# Patient Record
Sex: FEMALE | Race: WHITE
[De-identification: names, ages, dates, MRNs, and addresses within clinical notes are randomized per-mention and may not be internally consistent; named-entity substitution may affect disease eponyms.]

---

## 2017-02-02 ENCOUNTER — RX RENEWAL (OUTPATIENT)
Age: 72
End: 2017-02-02

## 2017-02-17 ENCOUNTER — RX RENEWAL (OUTPATIENT)
Age: 72
End: 2017-02-17

## 2017-07-09 ENCOUNTER — EMERGENCY (EMERGENCY)
Facility: HOSPITAL | Age: 72
LOS: 0 days | Discharge: HOME | End: 2017-07-09
Attending: EMERGENCY MEDICINE | Admitting: EMERGENCY MEDICINE
Payer: MEDICARE

## 2017-07-09 VITALS
RESPIRATION RATE: 18 BRPM | OXYGEN SATURATION: 100 % | SYSTOLIC BLOOD PRESSURE: 157 MMHG | DIASTOLIC BLOOD PRESSURE: 85 MMHG | HEART RATE: 100 BPM | TEMPERATURE: 99 F | WEIGHT: 179.9 LBS

## 2017-07-09 VITALS
HEART RATE: 92 BPM | RESPIRATION RATE: 18 BRPM | OXYGEN SATURATION: 100 % | DIASTOLIC BLOOD PRESSURE: 79 MMHG | SYSTOLIC BLOOD PRESSURE: 140 MMHG

## 2017-07-09 DIAGNOSIS — R51 HEADACHE: ICD-10-CM

## 2017-07-09 DIAGNOSIS — I10 ESSENTIAL (PRIMARY) HYPERTENSION: ICD-10-CM

## 2017-07-09 PROCEDURE — 70450 CT HEAD/BRAIN W/O DYE: CPT | Mod: 26

## 2017-07-09 PROCEDURE — 99284 EMERGENCY DEPT VISIT MOD MDM: CPT

## 2017-07-09 RX ORDER — DIPHENHYDRAMINE HCL 50 MG
25 CAPSULE ORAL ONCE
Qty: 0 | Refills: 0 | Status: COMPLETED | OUTPATIENT
Start: 2017-07-09 | End: 2017-07-09

## 2017-07-09 RX ORDER — SODIUM CHLORIDE 9 MG/ML
1000 INJECTION INTRAMUSCULAR; INTRAVENOUS; SUBCUTANEOUS ONCE
Qty: 0 | Refills: 0 | Status: COMPLETED | OUTPATIENT
Start: 2017-07-09 | End: 2017-07-09

## 2017-07-09 RX ORDER — KETOROLAC TROMETHAMINE 30 MG/ML
30 SYRINGE (ML) INJECTION ONCE
Qty: 0 | Refills: 0 | Status: DISCONTINUED | OUTPATIENT
Start: 2017-07-09 | End: 2017-07-09

## 2017-07-09 RX ORDER — PROCHLORPERAZINE MALEATE 5 MG
10 TABLET ORAL ONCE
Qty: 0 | Refills: 0 | Status: COMPLETED | OUTPATIENT
Start: 2017-07-09 | End: 2017-07-09

## 2017-07-09 RX ORDER — DEXAMETHASONE 0.5 MG/5ML
10 ELIXIR ORAL ONCE
Qty: 0 | Refills: 0 | Status: COMPLETED | OUTPATIENT
Start: 2017-07-09 | End: 2017-07-09

## 2017-07-09 RX ADMIN — Medication 10 MILLIGRAM(S): at 03:40

## 2017-07-09 RX ADMIN — SODIUM CHLORIDE 1000 MILLILITER(S): 9 INJECTION INTRAMUSCULAR; INTRAVENOUS; SUBCUTANEOUS at 03:30

## 2017-07-09 RX ADMIN — Medication 30 MILLIGRAM(S): at 04:25

## 2017-07-09 RX ADMIN — Medication 25 MILLIGRAM(S): at 03:35

## 2017-07-09 RX ADMIN — Medication 100 MILLIGRAM(S): at 06:10

## 2017-07-09 RX ADMIN — Medication 102 MILLIGRAM(S): at 03:32

## 2017-07-09 NOTE — ED PROVIDER NOTE - CONSTITUTIONAL, MLM
normal... uncomfortable appearing, well nourished, awake, alert, oriented to person, place, time/situation and in no apparent distress.

## 2017-07-09 NOTE — ED ADULT NURSE NOTE - OBJECTIVE STATEMENT
pt presents to ED, grabbing her head and anxious stating " I don't want to have any strokes" pt has hx of migraines. States she has been sick for a few weeks but the pain in her head is unbearable and she is nauseous and has vomited once since arrival. Partner at bedside.

## 2017-07-09 NOTE — ED ADULT TRIAGE NOTE - CHIEF COMPLAINT QUOTE
Patient states severe migraine 10/10, worst headache shes ever head. Vomitting and difficulty breathing

## 2017-07-09 NOTE — ED PROVIDER NOTE - PROGRESS NOTE DETAILS
AJM: Pt feeling significantly improved. ct hea dnormal. normal neuro exam. dc AJM: on dc pt noticed target like rash on left thigh. concern for lyme. will give doxy AJM: Pt feeling significantly improved. ct head normal. normal neuro exam. dc

## 2017-07-09 NOTE — ED PROVIDER NOTE - OBJECTIVE STATEMENT
Patient is a 71 year old female with history of HTN presenting with headache. Symptoms began 3 days ago. usually at ngiht. intermittent. Began again yesterday morning and slolwy worsening. Pain is worse in front of head. Patient notes headache was not sudden onset and began as mild headache that gradually grew more intense. Denies any focal weaknes/numbness. No confusion, changes in vision, neck pain. No family history of SAH or aneurysms. No syncope. no chest pain, sob, trauma. Took tylenol and zpac for presumed sinus infection. + photophobia. She notes she has had migraines in the past.

## 2017-07-09 NOTE — ED PROVIDER NOTE - MEDICAL DECISION MAKING DETAILS
71 year old female with headache. no neuro deficits. low suspicion for SAH. will ct head given how uncomfortable she appears. migraine cocktail. reassess

## 2017-07-28 ENCOUNTER — RX RENEWAL (OUTPATIENT)
Age: 72
End: 2017-07-28

## 2017-08-09 ENCOUNTER — LABORATORY RESULT (OUTPATIENT)
Age: 72
End: 2017-08-09

## 2017-08-12 LAB
25(OH)D3 SERPL-MCNC: 45.6 NG/ML
ADJUSTED MITOGEN: 4.86 IU/ML
ADJUSTED TB AG: 0 IU/ML
ALBUMIN SERPL ELPH-MCNC: 4.4 G/DL
ALP BLD-CCNC: 87 U/L
ALT SERPL-CCNC: 21 U/L
ANION GAP SERPL CALC-SCNC: 16 MMOL/L
APPEARANCE: CLEAR
AST SERPL-CCNC: 29 U/L
B BURGDOR DNA SPEC QL NAA+PROBE: NEGATIVE
B BURGDOR IGG+IGM SER QL IB: NORMAL
BASOPHILS # BLD AUTO: 0.01 K/UL
BASOPHILS NFR BLD AUTO: 0.2 %
BILIRUB SERPL-MCNC: 0.4 MG/DL
BILIRUBIN URINE: NEGATIVE
BLOOD URINE: NEGATIVE
BUN SERPL-MCNC: 19 MG/DL
CALCIUM SERPL-MCNC: 9.6 MG/DL
CHLORIDE SERPL-SCNC: 100 MMOL/L
CHOLEST SERPL-MCNC: 192 MG/DL
CHOLEST/HDLC SERPL: 2.4 RATIO
CO2 SERPL-SCNC: 22 MMOL/L
COLOR: YELLOW
CREAT SERPL-MCNC: 1.01 MG/DL
CRP SERPL-MCNC: 0.2 MG/DL
EOSINOPHIL # BLD AUTO: 0.17 K/UL
EOSINOPHIL NFR BLD AUTO: 3.5 %
ERYTHROCYTE [SEDIMENTATION RATE] IN BLOOD BY WESTERGREN METHOD: 18 MM/HR
GLUCOSE QUALITATIVE U: NORMAL MG/DL
GLUCOSE SERPL-MCNC: 102 MG/DL
HBV CORE IGG+IGM SER QL: NONREACTIVE
HBV SURFACE AB SER QL: NONREACTIVE
HBV SURFACE AG SER QL: NONREACTIVE
HCT VFR BLD CALC: 39.9 %
HCV AB SER QL: NONREACTIVE
HCV S/CO RATIO: 0.2 S/CO
HDLC SERPL-MCNC: 79 MG/DL
HGB BLD-MCNC: 14 G/DL
IMM GRANULOCYTES NFR BLD AUTO: 0.2 %
KETONES URINE: NEGATIVE
LDLC SERPL CALC-MCNC: 89 MG/DL
LEUKOCYTE ESTERASE URINE: NEGATIVE
LYMPHOCYTES # BLD AUTO: 1.64 K/UL
LYMPHOCYTES NFR BLD AUTO: 34.2 %
M TB IFN-G BLD-IMP: NEGATIVE
MAN DIFF?: NORMAL
MCHC RBC-ENTMCNC: 30.8 PG
MCHC RBC-ENTMCNC: 35.1 GM/DL
MCV RBC AUTO: 87.9 FL
MONOCYTES # BLD AUTO: 0.4 K/UL
MONOCYTES NFR BLD AUTO: 8.3 %
NEUTROPHILS # BLD AUTO: 2.57 K/UL
NEUTROPHILS NFR BLD AUTO: 53.6 %
NITRITE URINE: NEGATIVE
PH URINE: 6
PLATELET # BLD AUTO: 233 K/UL
POTASSIUM SERPL-SCNC: 3.9 MMOL/L
PROT SERPL-MCNC: 7.6 G/DL
PROTEIN URINE: ABNORMAL MG/DL
QUANTIFERON GOLD NIL: 0.03 IU/ML
RBC # BLD: 4.54 M/UL
RBC # FLD: 12.8 %
RHEUMATOID FACT SER QL: <7 IU/ML
SODIUM SERPL-SCNC: 138 MMOL/L
SPECIFIC GRAVITY URINE: 1.02
TRIGL SERPL-MCNC: 118 MG/DL
UROBILINOGEN URINE: NORMAL MG/DL
WBC # FLD AUTO: 4.8 K/UL

## 2017-08-15 ENCOUNTER — APPOINTMENT (OUTPATIENT)
Dept: RHEUMATOLOGY | Facility: CLINIC | Age: 72
End: 2017-08-15
Payer: MEDICARE

## 2017-08-15 VITALS
BODY MASS INDEX: 26.22 KG/M2 | SYSTOLIC BLOOD PRESSURE: 133 MMHG | HEIGHT: 69 IN | OXYGEN SATURATION: 98 % | HEART RATE: 89 BPM | TEMPERATURE: 98.4 F | WEIGHT: 177 LBS | DIASTOLIC BLOOD PRESSURE: 73 MMHG

## 2017-08-15 PROCEDURE — 99215 OFFICE O/P EST HI 40 MIN: CPT

## 2017-09-13 ENCOUNTER — LABORATORY RESULT (OUTPATIENT)
Age: 72
End: 2017-09-13

## 2017-09-14 LAB
25(OH)D3 SERPL-MCNC: 37.9 NG/ML
ALBUMIN SERPL ELPH-MCNC: 4.5 G/DL
ALP BLD-CCNC: 110 U/L
ALT SERPL-CCNC: 29 U/L
ANION GAP SERPL CALC-SCNC: 18 MMOL/L
APPEARANCE: CLEAR
AST SERPL-CCNC: 30 U/L
BASOPHILS # BLD AUTO: 0.02 K/UL
BASOPHILS NFR BLD AUTO: 0.4 %
BILIRUB SERPL-MCNC: 0.6 MG/DL
BILIRUBIN URINE: NEGATIVE
BLOOD URINE: ABNORMAL
BUN SERPL-MCNC: 23 MG/DL
CALCIUM SERPL-MCNC: 10.2 MG/DL
CALCIUM SERPL-MCNC: 10.2 MG/DL
CHLORIDE SERPL-SCNC: 100 MMOL/L
CHOLEST SERPL-MCNC: 251 MG/DL
CHOLEST/HDLC SERPL: 3.6 RATIO
CO2 SERPL-SCNC: 21 MMOL/L
COLOR: YELLOW
CREAT SERPL-MCNC: 0.95 MG/DL
CRP SERPL-MCNC: 0.3 MG/DL
EOSINOPHIL # BLD AUTO: 0.16 K/UL
EOSINOPHIL NFR BLD AUTO: 3.4 %
ERYTHROCYTE [SEDIMENTATION RATE] IN BLOOD BY WESTERGREN METHOD: 13 MM/HR
GLUCOSE QUALITATIVE U: NORMAL MG/DL
GLUCOSE SERPL-MCNC: 108 MG/DL
HCT VFR BLD CALC: 40.3 %
HDLC SERPL-MCNC: 70 MG/DL
HGB BLD-MCNC: 13.9 G/DL
IMM GRANULOCYTES NFR BLD AUTO: 0.2 %
KETONES URINE: NEGATIVE
LDLC SERPL CALC-MCNC: 144 MG/DL
LEUKOCYTE ESTERASE URINE: NEGATIVE
LYMPHOCYTES # BLD AUTO: 1.53 K/UL
LYMPHOCYTES NFR BLD AUTO: 32.6 %
MAN DIFF?: NORMAL
MCHC RBC-ENTMCNC: 30.9 PG
MCHC RBC-ENTMCNC: 34.5 GM/DL
MCV RBC AUTO: 89.6 FL
MONOCYTES # BLD AUTO: 0.31 K/UL
MONOCYTES NFR BLD AUTO: 6.6 %
NEUTROPHILS # BLD AUTO: 2.67 K/UL
NEUTROPHILS NFR BLD AUTO: 56.8 %
NITRITE URINE: NEGATIVE
PARATHYROID HORMONE INTACT: 27 PG/ML
PH URINE: 6.5
PLATELET # BLD AUTO: 245 K/UL
POTASSIUM SERPL-SCNC: 3.8 MMOL/L
PROT SERPL-MCNC: 7.3 G/DL
PROTEIN URINE: ABNORMAL MG/DL
RBC # BLD: 4.5 M/UL
RBC # FLD: 13.1 %
SODIUM SERPL-SCNC: 139 MMOL/L
SPECIFIC GRAVITY URINE: 1.02
TRIGL SERPL-MCNC: 187 MG/DL
UROBILINOGEN URINE: NORMAL MG/DL
WBC # FLD AUTO: 4.7 K/UL

## 2017-09-19 ENCOUNTER — APPOINTMENT (OUTPATIENT)
Dept: RHEUMATOLOGY | Facility: CLINIC | Age: 72
End: 2017-09-19
Payer: MEDICARE

## 2017-09-19 VITALS
HEART RATE: 79 BPM | HEIGHT: 69 IN | SYSTOLIC BLOOD PRESSURE: 144 MMHG | WEIGHT: 181 LBS | TEMPERATURE: 98.4 F | BODY MASS INDEX: 26.81 KG/M2 | OXYGEN SATURATION: 100 % | DIASTOLIC BLOOD PRESSURE: 80 MMHG

## 2017-09-19 LAB
B BURGDOR AB SER-IMP: POSITIVE
B BURGDOR DNA SPEC QL NAA+PROBE: NEGATIVE
B BURGDOR IGM PATRN SER IB-IMP: POSITIVE
B BURGDOR18/20KD IGM SER QL IB: NORMAL
B BURGDOR18KD IGG SER QL IB: PRESENT
B BURGDOR23KD IGG SER QL IB: PRESENT
B BURGDOR23KD IGM SER QL IB: PRESENT
B BURGDOR28KD AB SER QL IB: NORMAL
B BURGDOR28KD IGG SER QL IB: NORMAL
B BURGDOR30KD AB SER QL IB: NORMAL
B BURGDOR30KD IGG SER QL IB: NORMAL
B BURGDOR31KD IGG SER QL IB: NORMAL
B BURGDOR31KD IGM SER QL IB: NORMAL
B BURGDOR39KD IGG SER QL IB: PRESENT
B BURGDOR39KD IGM SER QL IB: PRESENT
B BURGDOR41KD IGG SER QL IB: PRESENT
B BURGDOR41KD IGM SER QL IB: PRESENT
B BURGDOR45KD AB SER QL IB: NORMAL
B BURGDOR45KD IGG SER QL IB: PRESENT
B BURGDOR58KD AB SER QL IB: NORMAL
B BURGDOR58KD IGG SER QL IB: PRESENT
B BURGDOR66KD IGG SER QL IB: NORMAL
B BURGDOR66KD IGM SER QL IB: NORMAL
B BURGDOR93KD IGG SER QL IB: NORMAL
B BURGDOR93KD IGM SER QL IB: NORMAL

## 2017-09-19 PROCEDURE — 99214 OFFICE O/P EST MOD 30 MIN: CPT

## 2017-09-24 LAB
A PHAGOCYTOPH IGG TITR SER IF: NORMAL
ANAPLASMA PHAGOCYTOPHILIA IGG ANTIBODIES: NORMAL
ANAPLASMA PHAGOCYTOPHILIA IGM ANTIBODIES: NORMAL
BABESIA ANTIBODIES, IGG: NORMAL
BABESIA ANTIBODIES, IGM: NORMAL
EHRLICHIA AB SER QL IA: NORMAL
EHRLICIA CHAFFEENIS IGG ANTIBODIES: NORMAL
EHRLICIA CHAFFEENIS IGM ANTIBODIES: NORMAL

## 2017-10-19 ENCOUNTER — APPOINTMENT (OUTPATIENT)
Dept: RHEUMATOLOGY | Facility: CLINIC | Age: 72
End: 2017-10-19

## 2017-11-03 ENCOUNTER — MEDICATION RENEWAL (OUTPATIENT)
Age: 72
End: 2017-11-03

## 2017-12-04 ENCOUNTER — RX RENEWAL (OUTPATIENT)
Age: 72
End: 2017-12-04

## 2017-12-04 ENCOUNTER — LABORATORY RESULT (OUTPATIENT)
Age: 72
End: 2017-12-04

## 2017-12-05 LAB
ALBUMIN SERPL ELPH-MCNC: 4.5 G/DL
ALP BLD-CCNC: 106 U/L
ALT SERPL-CCNC: 26 U/L
ANION GAP SERPL CALC-SCNC: 15 MMOL/L
APPEARANCE: CLEAR
AST SERPL-CCNC: 28 U/L
B BURGDOR AB SER-IMP: NEGATIVE
B BURGDOR IGM PATRN SER IB-IMP: POSITIVE
B BURGDOR18/20KD IGM SER QL IB: NORMAL
B BURGDOR18KD IGG SER QL IB: PRESENT
B BURGDOR23KD IGG SER QL IB: PRESENT
B BURGDOR23KD IGM SER QL IB: PRESENT
B BURGDOR28KD AB SER QL IB: NORMAL
B BURGDOR28KD IGG SER QL IB: NORMAL
B BURGDOR30KD AB SER QL IB: NORMAL
B BURGDOR30KD IGG SER QL IB: NORMAL
B BURGDOR31KD IGG SER QL IB: NORMAL
B BURGDOR31KD IGM SER QL IB: NORMAL
B BURGDOR39KD IGG SER QL IB: PRESENT
B BURGDOR39KD IGM SER QL IB: NORMAL
B BURGDOR41KD IGG SER QL IB: NORMAL
B BURGDOR41KD IGM SER QL IB: PRESENT
B BURGDOR45KD AB SER QL IB: NORMAL
B BURGDOR45KD IGG SER QL IB: NORMAL
B BURGDOR58KD AB SER QL IB: NORMAL
B BURGDOR58KD IGG SER QL IB: NORMAL
B BURGDOR66KD IGG SER QL IB: NORMAL
B BURGDOR66KD IGM SER QL IB: NORMAL
B BURGDOR93KD IGG SER QL IB: NORMAL
B BURGDOR93KD IGM SER QL IB: NORMAL
BASOPHILS # BLD AUTO: 0.02 K/UL
BASOPHILS NFR BLD AUTO: 0.4 %
BILIRUB SERPL-MCNC: 0.5 MG/DL
BILIRUBIN URINE: NEGATIVE
BLOOD URINE: NEGATIVE
BUN SERPL-MCNC: 17 MG/DL
CALCIUM SERPL-MCNC: 10.1 MG/DL
CHLORIDE SERPL-SCNC: 98 MMOL/L
CO2 SERPL-SCNC: 24 MMOL/L
COLOR: YELLOW
CREAT SERPL-MCNC: 0.98 MG/DL
CRP SERPL-MCNC: 0.2 MG/DL
EOSINOPHIL # BLD AUTO: 0.19 K/UL
EOSINOPHIL NFR BLD AUTO: 3.6 %
ERYTHROCYTE [SEDIMENTATION RATE] IN BLOOD BY WESTERGREN METHOD: 18 MM/HR
GLUCOSE QUALITATIVE U: NEGATIVE MG/DL
GLUCOSE SERPL-MCNC: 106 MG/DL
HCT VFR BLD CALC: 40.9 %
HGB BLD-MCNC: 14.8 G/DL
IMM GRANULOCYTES NFR BLD AUTO: 0.6 %
KETONES URINE: NEGATIVE
LEUKOCYTE ESTERASE URINE: ABNORMAL
LYMPHOCYTES # BLD AUTO: 1.64 K/UL
LYMPHOCYTES NFR BLD AUTO: 30.7 %
MAN DIFF?: NORMAL
MCHC RBC-ENTMCNC: 32.5 PG
MCHC RBC-ENTMCNC: 36.2 GM/DL
MCV RBC AUTO: 89.7 FL
MONOCYTES # BLD AUTO: 0.45 K/UL
MONOCYTES NFR BLD AUTO: 8.4 %
NEUTROPHILS # BLD AUTO: 3.02 K/UL
NEUTROPHILS NFR BLD AUTO: 56.3 %
NITRITE URINE: NEGATIVE
PH URINE: 6.5
PLATELET # BLD AUTO: 248 K/UL
POTASSIUM SERPL-SCNC: 3.8 MMOL/L
PROT SERPL-MCNC: 7.6 G/DL
PROTEIN URINE: NEGATIVE MG/DL
RBC # BLD: 4.56 M/UL
RBC # FLD: 12.7 %
SODIUM SERPL-SCNC: 137 MMOL/L
SPECIFIC GRAVITY URINE: 1.02
UROBILINOGEN URINE: NEGATIVE MG/DL
WBC # FLD AUTO: 5.35 K/UL

## 2017-12-07 LAB — TSH SERPL-ACNC: 5.46 UIU/ML

## 2017-12-08 ENCOUNTER — APPOINTMENT (OUTPATIENT)
Dept: RHEUMATOLOGY | Facility: CLINIC | Age: 72
End: 2017-12-08
Payer: MEDICARE

## 2017-12-08 VITALS
HEART RATE: 82 BPM | WEIGHT: 183 LBS | HEIGHT: 69 IN | DIASTOLIC BLOOD PRESSURE: 74 MMHG | BODY MASS INDEX: 27.11 KG/M2 | TEMPERATURE: 98 F | SYSTOLIC BLOOD PRESSURE: 150 MMHG | OXYGEN SATURATION: 99 %

## 2017-12-08 DIAGNOSIS — J32.1 CHRONIC FRONTAL SINUSITIS: ICD-10-CM

## 2017-12-08 PROCEDURE — 99214 OFFICE O/P EST MOD 30 MIN: CPT

## 2017-12-08 RX ORDER — ZOSTER VACCINE RECOMBINANT, ADJUVANTED 50 MCG/0.5
50 KIT INTRAMUSCULAR
Qty: 1 | Refills: 0 | Status: ACTIVE | COMMUNITY
Start: 2017-12-08 | End: 1900-01-01

## 2018-01-21 ENCOUNTER — TRANSCRIPTION ENCOUNTER (OUTPATIENT)
Age: 73
End: 2018-01-21

## 2018-02-07 ENCOUNTER — LABORATORY RESULT (OUTPATIENT)
Age: 73
End: 2018-02-07

## 2018-02-09 LAB
25(OH)D3 SERPL-MCNC: 51.3 NG/ML
ALBUMIN SERPL ELPH-MCNC: 4.5 G/DL
ALP BLD-CCNC: 104 U/L
ALT SERPL-CCNC: 20 U/L
ANION GAP SERPL CALC-SCNC: 18 MMOL/L
APPEARANCE: CLEAR
AST SERPL-CCNC: 27 U/L
BASOPHILS # BLD AUTO: 0.01 K/UL
BASOPHILS NFR BLD AUTO: 0.2 %
BILIRUB SERPL-MCNC: 0.5 MG/DL
BILIRUBIN URINE: NEGATIVE
BLOOD URINE: ABNORMAL
BUN SERPL-MCNC: 20 MG/DL
CALCIUM SERPL-MCNC: 10.1 MG/DL
CALCIUM SERPL-MCNC: 10.1 MG/DL
CHLORIDE SERPL-SCNC: 100 MMOL/L
CO2 SERPL-SCNC: 23 MMOL/L
COLOR: YELLOW
CREAT SERPL-MCNC: 1.04 MG/DL
CRP SERPL-MCNC: 0.2 MG/DL
EOSINOPHIL # BLD AUTO: 0.16 K/UL
EOSINOPHIL NFR BLD AUTO: 2.9 %
ERYTHROCYTE [SEDIMENTATION RATE] IN BLOOD BY WESTERGREN METHOD: 14 MM/HR
GLUCOSE QUALITATIVE U: NEGATIVE MG/DL
GLUCOSE SERPL-MCNC: 122 MG/DL
HCT VFR BLD CALC: 42.8 %
HGB BLD-MCNC: 15.1 G/DL
IMM GRANULOCYTES NFR BLD AUTO: 0.2 %
KETONES URINE: NEGATIVE
LEUKOCYTE ESTERASE URINE: NEGATIVE
LYMPHOCYTES # BLD AUTO: 1.8 K/UL
LYMPHOCYTES NFR BLD AUTO: 33 %
MAN DIFF?: NORMAL
MCHC RBC-ENTMCNC: 31.7 PG
MCHC RBC-ENTMCNC: 35.3 GM/DL
MCV RBC AUTO: 89.9 FL
MONOCYTES # BLD AUTO: 0.47 K/UL
MONOCYTES NFR BLD AUTO: 8.6 %
NEUTROPHILS # BLD AUTO: 3.01 K/UL
NEUTROPHILS NFR BLD AUTO: 55.1 %
NITRITE URINE: NEGATIVE
PARATHYROID HORMONE INTACT: 32 PG/ML
PH URINE: 7
PLATELET # BLD AUTO: 239 K/UL
POTASSIUM SERPL-SCNC: 3.7 MMOL/L
PROT SERPL-MCNC: 7.8 G/DL
PROTEIN URINE: 30 MG/DL
RBC # BLD: 4.76 M/UL
RBC # FLD: 12.7 %
SODIUM SERPL-SCNC: 141 MMOL/L
SPECIFIC GRAVITY URINE: 1.02
UROBILINOGEN URINE: NEGATIVE MG/DL
WBC # FLD AUTO: 5.46 K/UL

## 2018-02-12 LAB
B BURGDOR AB SER-IMP: NEGATIVE
B BURGDOR IGM PATRN SER IB-IMP: POSITIVE
B BURGDOR18/20KD IGM SER QL IB: NORMAL
B BURGDOR18KD IGG SER QL IB: NORMAL
B BURGDOR23KD IGG SER QL IB: PRESENT
B BURGDOR23KD IGM SER QL IB: PRESENT
B BURGDOR28KD AB SER QL IB: NORMAL
B BURGDOR28KD IGG SER QL IB: NORMAL
B BURGDOR30KD AB SER QL IB: NORMAL
B BURGDOR30KD IGG SER QL IB: NORMAL
B BURGDOR31KD IGG SER QL IB: NORMAL
B BURGDOR31KD IGM SER QL IB: NORMAL
B BURGDOR39KD IGG SER QL IB: PRESENT
B BURGDOR39KD IGM SER QL IB: PRESENT
B BURGDOR41KD IGG SER QL IB: NORMAL
B BURGDOR41KD IGM SER QL IB: NORMAL
B BURGDOR45KD AB SER QL IB: NORMAL
B BURGDOR45KD IGG SER QL IB: NORMAL
B BURGDOR58KD AB SER QL IB: NORMAL
B BURGDOR58KD IGG SER QL IB: NORMAL
B BURGDOR66KD IGG SER QL IB: NORMAL
B BURGDOR66KD IGM SER QL IB: NORMAL
B BURGDOR93KD IGG SER QL IB: NORMAL
B BURGDOR93KD IGM SER QL IB: NORMAL

## 2018-02-13 ENCOUNTER — APPOINTMENT (OUTPATIENT)
Dept: RHEUMATOLOGY | Facility: CLINIC | Age: 73
End: 2018-02-13
Payer: MEDICARE

## 2018-02-13 VITALS
OXYGEN SATURATION: 98 % | SYSTOLIC BLOOD PRESSURE: 130 MMHG | BODY MASS INDEX: 26.07 KG/M2 | DIASTOLIC BLOOD PRESSURE: 75 MMHG | WEIGHT: 176 LBS | HEART RATE: 83 BPM | HEIGHT: 69 IN

## 2018-02-13 DIAGNOSIS — R73.9 HYPERGLYCEMIA, UNSPECIFIED: ICD-10-CM

## 2018-02-13 DIAGNOSIS — E55.9 VITAMIN D DEFICIENCY, UNSPECIFIED: ICD-10-CM

## 2018-02-13 PROCEDURE — 99213 OFFICE O/P EST LOW 20 MIN: CPT

## 2018-02-13 RX ORDER — DOXYCYCLINE 100 MG/1
100 TABLET, FILM COATED ORAL
Qty: 42 | Refills: 0 | Status: DISCONTINUED | COMMUNITY
Start: 2017-07-09 | End: 2018-02-13

## 2018-03-16 ENCOUNTER — LABORATORY RESULT (OUTPATIENT)
Age: 73
End: 2018-03-16

## 2018-03-20 ENCOUNTER — FORM ENCOUNTER (OUTPATIENT)
Age: 73
End: 2018-03-20

## 2018-03-20 ENCOUNTER — APPOINTMENT (OUTPATIENT)
Dept: RHEUMATOLOGY | Facility: CLINIC | Age: 73
End: 2018-03-20
Payer: MEDICARE

## 2018-03-20 VITALS
HEART RATE: 84 BPM | WEIGHT: 178 LBS | BODY MASS INDEX: 26.36 KG/M2 | SYSTOLIC BLOOD PRESSURE: 139 MMHG | OXYGEN SATURATION: 99 % | HEIGHT: 69 IN | DIASTOLIC BLOOD PRESSURE: 79 MMHG

## 2018-03-20 PROCEDURE — 99214 OFFICE O/P EST MOD 30 MIN: CPT

## 2018-03-21 ENCOUNTER — OUTPATIENT (OUTPATIENT)
Dept: OUTPATIENT SERVICES | Facility: HOSPITAL | Age: 73
LOS: 1 days | End: 2018-03-21
Payer: MEDICARE

## 2018-03-21 ENCOUNTER — APPOINTMENT (OUTPATIENT)
Dept: ULTRASOUND IMAGING | Facility: CLINIC | Age: 73
End: 2018-03-21
Payer: MEDICARE

## 2018-03-21 DIAGNOSIS — Z00.8 ENCOUNTER FOR OTHER GENERAL EXAMINATION: ICD-10-CM

## 2018-03-21 PROCEDURE — 76536 US EXAM OF HEAD AND NECK: CPT

## 2018-03-21 PROCEDURE — 76536 US EXAM OF HEAD AND NECK: CPT | Mod: 26

## 2018-03-23 ENCOUNTER — APPOINTMENT (OUTPATIENT)
Dept: RHEUMATOLOGY | Facility: CLINIC | Age: 73
End: 2018-03-23
Payer: MEDICARE

## 2018-03-23 VITALS
SYSTOLIC BLOOD PRESSURE: 133 MMHG | BODY MASS INDEX: 26.36 KG/M2 | DIASTOLIC BLOOD PRESSURE: 75 MMHG | WEIGHT: 178 LBS | OXYGEN SATURATION: 97 % | HEIGHT: 69 IN | HEART RATE: 95 BPM

## 2018-03-23 DIAGNOSIS — A69.20 LYME DISEASE, UNSPECIFIED: ICD-10-CM

## 2018-03-23 PROCEDURE — 99214 OFFICE O/P EST MOD 30 MIN: CPT

## 2018-03-23 RX ORDER — GABAPENTIN 100 MG/1
100 CAPSULE ORAL
Qty: 90 | Refills: 1 | Status: DISCONTINUED | COMMUNITY
Start: 2017-10-24 | End: 2018-03-23

## 2018-03-29 PROBLEM — A69.20: Status: ACTIVE | Noted: 2018-03-20

## 2018-04-09 ENCOUNTER — TRANSCRIPTION ENCOUNTER (OUTPATIENT)
Age: 73
End: 2018-04-09

## 2018-04-10 ENCOUNTER — APPOINTMENT (OUTPATIENT)
Dept: RHEUMATOLOGY | Facility: CLINIC | Age: 73
End: 2018-04-10
Payer: MEDICARE

## 2018-04-10 VITALS
HEIGHT: 69 IN | HEART RATE: 105 BPM | WEIGHT: 178 LBS | DIASTOLIC BLOOD PRESSURE: 70 MMHG | SYSTOLIC BLOOD PRESSURE: 130 MMHG | OXYGEN SATURATION: 97 % | BODY MASS INDEX: 26.36 KG/M2

## 2018-04-10 PROCEDURE — 99214 OFFICE O/P EST MOD 30 MIN: CPT

## 2018-04-10 RX ORDER — DICLOFENAC SODIUM 10 MG/G
1 GEL TOPICAL
Qty: 500 | Refills: 0 | Status: ACTIVE | COMMUNITY
Start: 2017-10-24

## 2018-04-10 RX ORDER — MISOPROSTOL 200 UG/1
200 TABLET ORAL
Qty: 2 | Refills: 0 | Status: ACTIVE | COMMUNITY
Start: 2018-02-09

## 2018-04-10 RX ORDER — PENCICLOVIR 10 MG/G
1 CREAM TOPICAL
Qty: 5 | Refills: 0 | Status: ACTIVE | COMMUNITY
Start: 2017-11-07

## 2018-04-18 ENCOUNTER — RX RENEWAL (OUTPATIENT)
Age: 73
End: 2018-04-18

## 2018-05-04 ENCOUNTER — RX RENEWAL (OUTPATIENT)
Age: 73
End: 2018-05-04

## 2018-05-04 RX ORDER — ERGOCALCIFEROL 1.25 MG/1
1.25 MG CAPSULE, LIQUID FILLED ORAL
Qty: 12 | Refills: 3 | Status: ACTIVE | COMMUNITY
Start: 2017-11-03 | End: 1900-01-01

## 2018-05-22 ENCOUNTER — APPOINTMENT (OUTPATIENT)
Dept: RHEUMATOLOGY | Facility: CLINIC | Age: 73
End: 2018-05-22
Payer: MEDICARE

## 2018-05-22 VITALS
WEIGHT: 185 LBS | OXYGEN SATURATION: 98 % | HEART RATE: 105 BPM | HEIGHT: 69 IN | BODY MASS INDEX: 27.4 KG/M2 | DIASTOLIC BLOOD PRESSURE: 79 MMHG | SYSTOLIC BLOOD PRESSURE: 137 MMHG

## 2018-05-22 DIAGNOSIS — G47.00 INSOMNIA, UNSPECIFIED: ICD-10-CM

## 2018-05-22 DIAGNOSIS — F41.1 GENERALIZED ANXIETY DISORDER: ICD-10-CM

## 2018-05-22 DIAGNOSIS — A69.20 LYME DISEASE, UNSPECIFIED: ICD-10-CM

## 2018-05-22 DIAGNOSIS — E03.8 OTHER SPECIFIED HYPOTHYROIDISM: ICD-10-CM

## 2018-05-22 DIAGNOSIS — E06.3 OTHER SPECIFIED HYPOTHYROIDISM: ICD-10-CM

## 2018-05-22 PROCEDURE — 99214 OFFICE O/P EST MOD 30 MIN: CPT

## 2018-05-22 RX ORDER — FOLIC ACID-PYRIDOXINE-CYANOCOBALAMIN TAB 2.5-25-2 MG 2.5-25-2 MG
2.5-25-2 TAB ORAL
Qty: 90 | Refills: 3 | Status: ACTIVE | COMMUNITY
Start: 2018-05-22 | End: 1900-01-01

## 2018-05-22 RX ORDER — MONTELUKAST 10 MG/1
10 TABLET, FILM COATED ORAL
Qty: 90 | Refills: 0 | Status: ACTIVE | COMMUNITY
Start: 2018-05-22

## 2018-05-22 RX ORDER — HYDROXYZINE HYDROCHLORIDE 25 MG/1
25 TABLET ORAL
Qty: 21 | Refills: 0 | Status: DISCONTINUED | COMMUNITY
Start: 2018-04-09 | End: 2018-05-22

## 2018-05-22 RX ORDER — SERTRALINE 25 MG/1
25 TABLET, FILM COATED ORAL DAILY
Qty: 90 | Refills: 2 | Status: DISCONTINUED | COMMUNITY
Start: 2018-03-23 | End: 2018-05-22

## 2018-05-23 ENCOUNTER — OTHER (OUTPATIENT)
Age: 73
End: 2018-05-23

## 2018-05-25 PROBLEM — E03.8 HYPOTHYROIDISM DUE TO HASHIMOTO'S THYROIDITIS: Status: ACTIVE | Noted: 2017-09-24

## 2018-05-25 PROBLEM — A69.20 ACUTE LYME DISEASE: Status: ACTIVE | Noted: 2017-08-15

## 2018-05-25 PROBLEM — F41.1 GENERALIZED ANXIETY DISORDER: Status: ACTIVE | Noted: 2018-03-20

## 2018-06-01 ENCOUNTER — RX RENEWAL (OUTPATIENT)
Age: 73
End: 2018-06-01

## 2018-07-12 ENCOUNTER — MEDICATION RENEWAL (OUTPATIENT)
Age: 73
End: 2018-07-12

## 2018-07-16 ENCOUNTER — MEDICATION RENEWAL (OUTPATIENT)
Age: 73
End: 2018-07-16

## 2018-07-16 RX ORDER — AMITRIPTYLINE HYDROCHLORIDE 10 MG/1
10 TABLET, FILM COATED ORAL
Qty: 90 | Refills: 1 | Status: ACTIVE | COMMUNITY
Start: 2018-05-22 | End: 1900-01-01

## 2018-08-14 ENCOUNTER — OTHER (OUTPATIENT)
Age: 73
End: 2018-08-14

## 2018-09-20 ENCOUNTER — MEDICATION RENEWAL (OUTPATIENT)
Age: 73
End: 2018-09-20

## 2018-09-20 RX ORDER — TRAMADOL HYDROCHLORIDE AND ACETAMINOPHEN 37.5; 325 MG/1; MG/1
37.5-325 TABLET, FILM COATED ORAL
Qty: 90 | Refills: 0 | Status: ACTIVE | COMMUNITY
Start: 2018-04-26 | End: 1900-01-01

## 2018-10-15 PROBLEM — I10 ESSENTIAL (PRIMARY) HYPERTENSION: Chronic | Status: ACTIVE | Noted: 2017-07-09

## 2018-11-20 ENCOUNTER — TRANSCRIPTION ENCOUNTER (OUTPATIENT)
Age: 73
End: 2018-11-20

## 2019-01-03 ENCOUNTER — APPOINTMENT (OUTPATIENT)
Dept: RHEUMATOLOGY | Facility: CLINIC | Age: 74
End: 2019-01-03
Payer: MEDICARE

## 2019-01-03 VITALS
WEIGHT: 180 LBS | BODY MASS INDEX: 26.66 KG/M2 | DIASTOLIC BLOOD PRESSURE: 82 MMHG | HEART RATE: 92 BPM | HEIGHT: 69 IN | SYSTOLIC BLOOD PRESSURE: 137 MMHG | OXYGEN SATURATION: 98 %

## 2019-01-03 DIAGNOSIS — M06.9 RHEUMATOID ARTHRITIS, UNSPECIFIED: ICD-10-CM

## 2019-01-03 DIAGNOSIS — M35.00 SICCA SYNDROME, UNSPECIFIED: ICD-10-CM

## 2019-01-03 PROCEDURE — 99214 OFFICE O/P EST MOD 30 MIN: CPT

## 2019-01-03 RX ORDER — CHLORHEXIDINE GLUCONATE 4 %
5 LIQUID (ML) TOPICAL
Qty: 90 | Refills: 0 | Status: DISCONTINUED | COMMUNITY
Start: 2018-05-22 | End: 2019-01-03

## 2019-01-03 RX ORDER — ALPRAZOLAM 0.25 MG/1
0.25 TABLET ORAL
Qty: 60 | Refills: 0 | Status: DISCONTINUED | COMMUNITY
Start: 2018-03-20 | End: 2019-01-03

## 2019-01-03 RX ORDER — ACYCLOVIR 200 MG/1
200 CAPSULE ORAL
Qty: 20 | Refills: 0 | Status: DISCONTINUED | COMMUNITY
Start: 2017-11-27 | End: 2019-01-03

## 2019-03-18 DIAGNOSIS — B02.29 OTHER POSTHERPETIC NERVOUS SYSTEM INVOLVEMENT: ICD-10-CM

## 2019-03-20 ENCOUNTER — OTHER (OUTPATIENT)
Age: 74
End: 2019-03-20

## 2019-03-22 ENCOUNTER — OTHER (OUTPATIENT)
Age: 74
End: 2019-03-22

## 2019-06-12 ENCOUNTER — RX RENEWAL (OUTPATIENT)
Age: 74
End: 2019-06-12

## 2019-07-10 ENCOUNTER — TRANSCRIPTION ENCOUNTER (OUTPATIENT)
Age: 74
End: 2019-07-10

## 2019-08-09 ENCOUNTER — MEDICATION RENEWAL (OUTPATIENT)
Age: 74
End: 2019-08-09

## 2019-08-09 RX ORDER — DICLOFENAC EPOLAMINE 0.01 G/1
1.3 SYSTEM TOPICAL TWICE DAILY
Qty: 1 | Refills: 1 | Status: ACTIVE | COMMUNITY
Start: 2019-03-18 | End: 1900-01-01

## 2019-08-11 ENCOUNTER — RX RENEWAL (OUTPATIENT)
Age: 74
End: 2019-08-11

## 2020-01-22 ENCOUNTER — RX RENEWAL (OUTPATIENT)
Age: 75
End: 2020-01-22

## 2020-02-26 ENCOUNTER — RX RENEWAL (OUTPATIENT)
Age: 75
End: 2020-02-26

## 2020-04-01 ENCOUNTER — RX RENEWAL (OUTPATIENT)
Age: 75
End: 2020-04-01

## 2020-05-01 ENCOUNTER — APPOINTMENT (OUTPATIENT)
Dept: RHEUMATOLOGY | Facility: CLINIC | Age: 75
End: 2020-05-01

## 2020-08-16 ENCOUNTER — RX RENEWAL (OUTPATIENT)
Age: 75
End: 2020-08-16

## 2020-08-17 RX ORDER — DICLOFENAC EPOLAMINE 0.01 G/1
1.3 SYSTEM TOPICAL
Qty: 30 | Refills: 0 | Status: ACTIVE | COMMUNITY
Start: 2019-06-12 | End: 1900-01-01

## 2022-05-10 ENCOUNTER — RESULT REVIEW (OUTPATIENT)
Age: 77
End: 2022-05-10

## 2022-08-08 ENCOUNTER — NON-APPOINTMENT (OUTPATIENT)
Age: 77
End: 2022-08-08

## 2023-08-09 ENCOUNTER — EMERGENCY (EMERGENCY)
Facility: HOSPITAL | Age: 78
LOS: 0 days | Discharge: HOME | End: 2023-08-10
Attending: EMERGENCY MEDICINE
Payer: MEDICARE

## 2023-08-09 VITALS
DIASTOLIC BLOOD PRESSURE: 73 MMHG | HEART RATE: 90 BPM | SYSTOLIC BLOOD PRESSURE: 123 MMHG | RESPIRATION RATE: 18 BRPM | OXYGEN SATURATION: 100 %

## 2023-08-09 VITALS — WEIGHT: 186.95 LBS | HEIGHT: 64 IN

## 2023-08-09 DIAGNOSIS — Z88.5 ALLERGY STATUS TO NARCOTIC AGENT: ICD-10-CM
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
## 2023-08-09 DIAGNOSIS — Z88.0 ALLERGY STATUS TO PENICILLIN: ICD-10-CM
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
## 2023-08-09 DIAGNOSIS — S82.61XA DISPLACED FRACTURE OF LATERAL MALLEOLUS OF RIGHT FIBULA, INITIAL ENCOUNTER FOR CLOSED FRACTURE: ICD-10-CM

## 2023-08-09 DIAGNOSIS — M25.571 PAIN IN RIGHT ANKLE AND JOINTS OF RIGHT FOOT: ICD-10-CM

## 2023-08-09 DIAGNOSIS — I10 ESSENTIAL (PRIMARY) HYPERTENSION: ICD-10-CM

## 2023-08-09 PROCEDURE — 73502 X-RAY EXAM HIP UNI 2-3 VIEWS: CPT | Mod: RT

## 2023-08-09 PROCEDURE — 73590 X-RAY EXAM OF LOWER LEG: CPT | Mod: RT

## 2023-08-09 PROCEDURE — 99285 EMERGENCY DEPT VISIT HI MDM: CPT | Mod: 25

## 2023-08-09 PROCEDURE — 73560 X-RAY EXAM OF KNEE 1 OR 2: CPT | Mod: 26,RT

## 2023-08-09 PROCEDURE — 73600 X-RAY EXAM OF ANKLE: CPT | Mod: RT

## 2023-08-09 PROCEDURE — 73590 X-RAY EXAM OF LOWER LEG: CPT | Mod: 26,RT

## 2023-08-09 PROCEDURE — 27788 TREATMENT OF ANKLE FRACTURE: CPT | Mod: RT

## 2023-08-09 PROCEDURE — 73502 X-RAY EXAM HIP UNI 2-3 VIEWS: CPT | Mod: 26,RT

## 2023-08-09 PROCEDURE — 73560 X-RAY EXAM OF KNEE 1 OR 2: CPT | Mod: RT

## 2023-08-09 PROCEDURE — 73610 X-RAY EXAM OF ANKLE: CPT | Mod: 26,RT,77

## 2023-08-09 PROCEDURE — 73610 X-RAY EXAM OF ANKLE: CPT | Mod: RT

## 2023-08-09 PROCEDURE — 99285 EMERGENCY DEPT VISIT HI MDM: CPT | Mod: FS

## 2023-08-09 PROCEDURE — 73552 X-RAY EXAM OF FEMUR 2/>: CPT | Mod: 26,RT

## 2023-08-09 PROCEDURE — 96374 THER/PROPH/DIAG INJ IV PUSH: CPT | Mod: XU

## 2023-08-09 PROCEDURE — 73630 X-RAY EXAM OF FOOT: CPT | Mod: RT

## 2023-08-09 PROCEDURE — 73552 X-RAY EXAM OF FEMUR 2/>: CPT | Mod: RT

## 2023-08-09 PROCEDURE — 73610 X-RAY EXAM OF ANKLE: CPT | Mod: 26,RT

## 2023-08-09 PROCEDURE — 73630 X-RAY EXAM OF FOOT: CPT | Mod: 26,RT

## 2023-08-09 RX ORDER — ONDANSETRON 8 MG/1
1 TABLET, FILM COATED ORAL
Qty: 15 | Refills: 0
Start: 2023-08-09

## 2023-08-09 RX ORDER — MORPHINE SULFATE 50 MG/1
4 CAPSULE, EXTENDED RELEASE ORAL ONCE
Refills: 0 | Status: DISCONTINUED | OUTPATIENT
Start: 2023-08-09 | End: 2023-08-09

## 2023-08-09 RX ORDER — ONDANSETRON 8 MG/1
4 TABLET, FILM COATED ORAL ONCE
Refills: 0 | Status: COMPLETED | OUTPATIENT
Start: 2023-08-09 | End: 2023-08-09

## 2023-08-09 RX ORDER — TRAMADOL HYDROCHLORIDE 50 MG/1
1 TABLET ORAL
Qty: 15 | Refills: 0
Start: 2023-08-09

## 2023-08-09 RX ADMIN — ONDANSETRON 4 MILLIGRAM(S): 8 TABLET, FILM COATED ORAL at 21:25

## 2023-08-09 RX ADMIN — ONDANSETRON 4 MILLIGRAM(S): 8 TABLET, FILM COATED ORAL at 19:43

## 2023-08-09 NOTE — ED ADULT TRIAGE NOTE - CHIEF COMPLAINT QUOTE
pt presents to ed for evaluation of right ankle pain after slip and fall while hiking this morning, denies head strike or loc or blood thinner

## 2023-08-09 NOTE — ED STATDOCS - OBJECTIVE STATEMENT
77 y/o female with PMHx of essential HTN presents to the ED c/o right ankle pain s/p slip and fall while hiking this morning. States she felt something "pop." Denies head strike, LOC, blood thinner use.

## 2023-08-09 NOTE — ED STATDOCS - PHYSICAL EXAMINATION
Gen: Well appearing in NAD  Head: NC/AT  Neck: Trachea midline  Resp: No distress  Ext: Right ankle with obvious deformity tenderness at the  lateral malleolus normal dorsalis pedis pulse  Neuro: A&O appears non focal  Skin: Warm and dry as visualized  Psych: Normal affect and mood Attending: Gen: Well appearing in NAD  Head: NC/AT  Neck: Trachea midline  Resp: No distress  Ext: Right ankle with obvious deformity tenderness at the  lateral malleolus normal dorsalis pedis pulse  Neuro: A&O appears non focal  Skin: Warm and dry as visualized  Psych: Normal affect and mood

## 2023-08-09 NOTE — CONSULT NOTE ADULT - SUBJECTIVE AND OBJECTIVE BOX
78y Female community ambulator without assistive device presents to ED after mechanical fall with severe right ankle pain. Patient denies headstrike or LOC. Felt immediate pain and inability to ambulate on the affected leg. Subsequently came to the ED for further evaluation and management. In the ED, the patient notes pain over the affected ankle and pain with range of motion. Patient denies any numbness, tingling, weakness, or any other orthopaedic complaint.     Physical Exam  Vital Signs Last 24 Hrs  T(C): 37 (08-09-23 @ 18:38), Max: 37 (08-09-23 @ 18:38)  T(F): 98.6 (08-09-23 @ 18:38), Max: 98.6 (08-09-23 @ 18:38)  HR: 90 (08-09-23 @ 23:53) (90 - 108)  BP: 123/73 (08-09-23 @ 23:53) (123/73 - 125/82)  BP(mean): 90 (08-09-23 @ 23:53) (90 - 97)  RR: 18 (08-09-23 @ 23:53) (16 - 18)  SpO2: 100% (08-09-23 @ 23:53) (100% - 100%)    Gen: Resting in bed, NAD  R LE:   Skin intact. Notable edema and deformity over the ankle. No lesions appreciated.   TTP over deformity, decreased and painful ROM of the ankle; otherwise, NTTP throughout the rest of the extremity.  No pain with axial load or log roll.  SILT L2-S1.    Q/H/EHL/FHL intact. TA/GSC unable to assess 2/2 pain.    DP pulse palpable.   No calf tenderness bilaterally.   Compartments soft and compressible.     Secondary Assessment:  NC/AT, NTTP of clavicles, NTTP of C-spine,T-spine, or L-spine in the midline and paraspinal areas; NTTP of pelvis  LUE: NTTP of Shoulder, Elbow, Wrist, Hand; NT with AROM/PROM of Shoulder, Elbow, Wrist, Hand; AIN/PIN/Med/Uln/Msc/Rad/Ax intact  RUE: NTTP of Shoulder, Elbow, Wrist, Hand; NT with AROM/PROM of Shoulder, Elbow, Wrist, Hand; AIN/PIN/Med/Uln/Msc/Rad/Ax intact   LLE: Able to SLR, NT with Log Roll, NT with Heel Strike, NTTP of Hip, Knee, Ankle, Foot; NT with AROM/PROM of Hip, Knee, Ankle, Foot; Q/H/GSC/TA/EHL/FHL intact      Imaging: XR imaging of the R ankle reviewed demonstrating bimalleolar equivalent fracture (personal read)    Procedure Note  Risks and benefits for the procedure were explained to the patient. An injection was offered to the patient for analgesia prior to procedure. The patient expressed understanding and agreed with the plan. The patient gave verbal consent for the injection and procedure. The skin was prepped in sterile fashion with alcohol scrub. The fracture site was then injected with 10mL 1% lidocaine without epinephrine. Time was allowed for anesthetic effect to occur. Once the patient was comfortable, the extremity was generally cleaned. The fracture was then manipulated/closed reduced. The extremity was wrapped with Webril, with care to pad the bony prominences over the heel and malleoli. A plaster splint was applied; wrapped with Webril and an ace wrap; and molded until hardened. Care was taken to avoid sharp edges and to protect the skin. The extremity was elevated on pillows and ice was applied. Neurovascular exam was unchanged after the procedure - SILT digits 1-5 and able to wiggle toes. Intact EHL/FHL. Post reduction films were ordered and demonstrated adequate reduction of the fracture.    Assessment and Plan  78y Female with right ankle fracture    Imaging findings reviewed and discussed with the patient.   Fracture reduced and splinted per above procedure note.   NWB R LE in trilam splint, crutches/walker for ambulation as needed  DVT ppx  QD  Ice/elevation  Signs and symptoms of compartment syndrome discussed with the patient; strict ED return precautions given  Pain control PRN  No acute orthopaedic surgical intervention indicated at this time. This patient is orthopaedically stable for discharge.   Patient to follow up with Dr. Knight or his colleague Dr. Barnes as an outpatient for further evaluation and management within one week.   All of the patient's questions and concerns were answered and addressed.  Will discuss with Dr. Knight and advise of any changes to the plan.

## 2023-08-09 NOTE — ED STATDOCS - PATIENT PORTAL LINK FT
You can access the FollowMyHealth Patient Portal offered by [facility] by registering at the following website: [email or web address]. By joining [employer]’s FollowMyHealth portal, you will also be able to view your health information using other applications (apps) compatible with our system. You can access the FollowMyHealth Patient Portal offered by [facility] by registering at the following website: [email or web address]. By joining [employer]’s FollowMyHealth portal, you will also be able to view your health information using other applications (apps) compatible with our system.

## 2023-08-09 NOTE — ED STATDOCS - PROGRESS NOTE DETAILS
PA: Patient is a 79 y/o female with PMHx of essential HTN who presents to [facility] c/o right ankle pain s/p slip and fall while hiking this morning. Patient states she felt something "pop." Denies head strike, LOC, blood thinner use. ~Ajay Mera PA-C +Lateral malleolus fracture right ankle. Spoke with ortho team, will see patient in ED shortly. ~Ajay Mera PA-C Waiting on ORTHO team, care transitioned to evening team. ~Ajay Mera PA-C

## 2023-08-09 NOTE — ED STATDOCS - CLINICAL SUMMARY MEDICAL DECISION MAKING FREE TEXT BOX
Patient with concern for ankle fracture. Will x-ray, pain control, reassess. Patient with concern for ankle fracture. Will x-ray, pain control, reassess.    Waiting on ORTHO team, care transitioned to evening team. ~Ajay Mera PA-C Patient with concern for ankle fracture. Will x-ray, pain control, reassess.    Waiting on ORTHO team, care transitioned to evening team. ~Ajay Mera PA-C    signed Gladys Ziegler PA-C Received patient in sign out from JACK Mera  Pt splinted by ortho, will need outpt sx for bimall equivalent fx, f/u with Molly on monday in office.  QD DVT ppx. NWB. Pt still nauseous and dry heaving in ED but would like to try and DC home. Discussed safety and risk of falls with pt an boyfriend and offered admission. Awaiting trial with crutches. If pt DCs home she notes ultram has worked for her in the past without causing this vomiting. Patient with concern for ankle fracture. Will x-ray, pain control, reassess.    Waiting on ORTHO team, care transitioned to evening team. ~Ajay Mera PA-C    signed Gladys Ziegler PA-C Received patient in sign out from JACK Mera  Pt splinted by ortho, will need outpt sx for bimall equivalent fx, f/u with Molly on monday in office.  QD DVT ppx. NWB. Pt still nauseous and dry heaving in ED but would like to try and DC home. Discussed safety and risk of falls with pt an boyfriend and offered admission. Awaiting trial with crutches. If pt DCs home she notes ultram has worked for her in the past without causing this vomiting.   Pt able to use crutches but notes she feels weak from everything she has been through. Again offered pt admission but she and her boyfriend want to go home, will  walker as well. Boyfriend says he will support pt to get into the house, [place]. They are aware they may return at any time for safety concerns or nay other concerns. Patient with concern for ankle fracture. Will x-ray, pain control, reassess.    Waiting on ORTHO team, care transitioned to evening team. ~Ajay Mera PA-C    signed Gladys Ziegler PA-C Received patient in sign out from JACK Mera  Pt splinted by ortho, will need outpt sx for bimall equivalent fx, f/u with Molly on monday in office.  QD DVT ppx. NWB. Pt still nauseous and dry heaving in ED but would like to try and DC home. Discussed safety and risk of falls with pt an boyfriend and offered admission. Awaiting trial with crutches. If pt DCs home she notes ultram has worked for her in the past without causing this vomiting.   Pt able to use crutches but notes she feels weak from everything she has been through. Again offered pt admission but she and her boyfriend want to go home, will  walker as well. Boyfriend says he will support pt to get into the house, [place]. They are aware they may return at any time for safety concerns or any other concerns. pt still very nauseated, offered her IV fluids before DC but she repeatedly declines and would like to go home and rest.

## 2023-08-09 NOTE — ED STATDOCS - NSFOLLOWUPINSTRUCTIONS_ED_ALL_ED_FT
Ankle Fracture    The ankle joint is made up of the lower (distal) sections of the lower leg bones, called the tibia and fibula, along with a bone in the foot called the talus. An ankle fracture is a break in one, two, or all three of these sections of bone. There are two general types of ankle fractures:  Stable fracture. This happens when one of the bones is broken, but the bones of the ankle joint stay in their normal positions.  Unstable fracture. This type can include more than one broken bone. It can also happen if the outer bone is broken and the strong tissues that connect bones to each other (ligaments) are also injured at the inner ankle. This type of fracture allows the talus to move out of its normal position.  What are the causes?  This condition may be caused by:  A hard, direct hit to the ankle.  Quickly and severely twisting your ankle, often while your foot is planted and the rest of your body is moving.  Trauma, such as from a car crash or a fall from a height.  What increases the risk?  The following factors may make you more likely to develop this condition:  Being overweight.  Participating in sports that involve quick direction changes, as in soccer.  Doing high-impact sports such as gymnastics or football.  What are the signs or symptoms?    Symptoms of this condition include:  A tender and swollen ankle.  Bruising around your injured ankle.  Pain when moving or pressing on your ankle.  Trouble walking or using your ankle to support your body weight (putting weight on your ankle).  Pain that gets worse when you move your foot or ankle or when you stand.  Pain that gets better with rest.  How is this diagnosed?  An ankle fracture is usually diagnosed with a physical exam and X-rays. You may also have a CT scan or an MRI.    How is this treated?  Treatment for this condition depends on the type of ankle fracture you have. Stable fractures are treated with a cast, boot, or splint to hold the ankle still and crutches to avoid putting weight on the ankle until the fracture heals. Unstable fractures require surgery to ensure that the bones heal properly. After surgery, you will have a splint. After your incision has healed, your surgeon may give you a cast or a boot. You will not be able to put weight on your injured side for several weeks.    After your ankle has healed, you will do physical therapy exercises to improve movement and strength in your ankle.    Follow these instructions at home:  If you have a boot or splint:    Wear the boot or splint as told by your health care provider. Remove it only as told by your health care provider.  Loosen it if your toes tingle, become numb, or turn cold and blue.  Keep it clean and dry.  If you have a cast:    Do not put pressure on any part of the cast until it is fully hardened. This may take several hours.  Do not stick anything inside the cast to scratch your skin. Doing that increases your risk of infection.  Check the skin around the cast every day. Tell your health care provider about any concerns.  You may put lotion on dry skin around the edges of the cast. Do not put lotion on the skin underneath the cast.  Keep it clean and dry.  Bathing    Do not take baths, swim, or use a hot tub until your health care provider approves. Ask your health care provider if you may take showers. You may only be allowed to take sponge baths.  If the cast, boot, or splint is not waterproof:  Do not let it get wet.  Cover it with a watertight covering when you take a bath or shower.  Managing pain, stiffness, and swelling      If directed, put ice on the injured area. To do this:  If you have a removable splint or boot, remove it as told by your health care provider.  Put ice in a plastic bag.  Place a towel between your skin and the bag or between your cast and the bag.  Leave the ice on for 20 minutes, 2–3 times a day.  Remove the ice if your skin turns bright red. This is very important. If you cannot feel pain, heat, or cold, you have a greater risk of damage to the area.  Move your toes often to reduce stiffness and swelling.  Raise (elevate) the injured area above the level of your heart while you are sitting or lying down.  Activity    Do exercises as told by your health care provider.  Return to your normal activities as told by your health care provider. Ask your health care provider what activities are safe for you.  Do not use the injured limb to support your body weight until your health care provider says that you can. Use crutches as told by your health care provider.  General instructions    Take over-the-counter and prescription medicines only as told by your health care provider.  Ask your health care provider when it is safe to drive if you have a cast, boot, or splint on your ankle.  Do not use any products that contain nicotine or tobacco, such as cigarettes, e-cigarettes, and chewing tobacco. These can delay bone healing. If you need help quitting, ask your health care provider.  Keep all follow-up visits. This is important.  Contact a health care provider if:  You have pain or swelling that gets worse or does not get better with rest or medicine.  Your cast gets damaged.  Get help right away if:  You have severe pain that lasts.  You develop new pain or swelling.  Your skin or toenails below the injury turn blue or gray, feel cold, become numb, or are less sensitive to the touch.  Summary  An ankle fracture can be stable or unstable. This is determined after a physical exam and imaging studies such as X-rays, a CT scan, or an MRI.  Stable fractures are treated with a cast, boot, or splint to hold the ankle still until the fracture heals. Unstable fractures require surgery to ensure that the bones heal properly.  You will not be able to put weight on your injured side for several weeks.  Medicines, icing, and raising (elevating) your injured ankle when you are sitting or lying down may help with pain relief. Follow instructions as told by your health care provider.  This information is not intended to replace advice given to you by your health care provider. Make sure you discuss any questions you have with your health care provider. FOLLOW UP WITH DR HAY IN HIS OFFICE ON MONDAY TO SCHEDULE SURGERY. CALL THE OFFICE TO MAKE AN APPOINTMENT. TAKE AN ADULT ASPIRIN 325MG ONCE A DAY TO PREVENT BLOOD CLOT. RETURN TO ER FOR ANY WORSENING SYMPTOMS OR NEW CONCERNS.     Ankle Fracture    The ankle joint is made up of the lower (distal) sections of the lower leg bones, called the tibia and fibula, along with a bone in the foot called the talus. An ankle fracture is a break in one, two, or all three of these sections of bone. There are two general types of ankle fractures:  Stable fracture. This happens when one of the bones is broken, but the bones of the ankle joint stay in their normal positions.  Unstable fracture. This type can include more than one broken bone. It can also happen if the outer bone is broken and the strong tissues that connect bones to each other (ligaments) are also injured at the inner ankle. This type of fracture allows the talus to move out of its normal position.  What are the causes?  This condition may be caused by:  A hard, direct hit to the ankle.  Quickly and severely twisting your ankle, often while your foot is planted and the rest of your body is moving.  Trauma, such as from a car crash or a fall from a height.  What increases the risk?  The following factors may make you more likely to develop this condition:  Being overweight.  Participating in sports that involve quick direction changes, as in soccer.  Doing high-impact sports such as gymnastics or football.  What are the signs or symptoms?    Symptoms of this condition include:  A tender and swollen ankle.  Bruising around your injured ankle.  Pain when moving or pressing on your ankle.  Trouble walking or using your ankle to support your body weight (putting weight on your ankle).  Pain that gets worse when you move your foot or ankle or when you stand.  Pain that gets better with rest.  How is this diagnosed?  An ankle fracture is usually diagnosed with a physical exam and X-rays. You may also have a CT scan or an MRI.    How is this treated?  Treatment for this condition depends on the type of ankle fracture you have. Stable fractures are treated with a cast, boot, or splint to hold the ankle still and crutches to avoid putting weight on the ankle until the fracture heals. Unstable fractures require surgery to ensure that the bones heal properly. After surgery, you will have a splint. After your incision has healed, your surgeon may give you a cast or a boot. You will not be able to put weight on your injured side for several weeks.    After your ankle has healed, you will do physical therapy exercises to improve movement and strength in your ankle.    Follow these instructions at home:  If you have a boot or splint:    Wear the boot or splint as told by your health care provider. Remove it only as told by your health care provider.  Loosen it if your toes tingle, become numb, or turn cold and blue.  Keep it clean and dry.  If you have a cast:    Do not put pressure on any part of the cast until it is fully hardened. This may take several hours.  Do not stick anything inside the cast to scratch your skin. Doing that increases your risk of infection.  Check the skin around the cast every day. Tell your health care provider about any concerns.  You may put lotion on dry skin around the edges of the cast. Do not put lotion on the skin underneath the cast.  Keep it clean and dry.  Bathing    Do not take baths, swim, or use a hot tub until your health care provider approves. Ask your health care provider if you may take showers. You may only be allowed to take sponge baths.  If the cast, boot, or splint is not waterproof:  Do not let it get wet.  Cover it with a watertight covering when you take a bath or shower.  Managing pain, stiffness, and swelling      If directed, put ice on the injured area. To do this:  If you have a removable splint or boot, remove it as told by your health care provider.  Put ice in a plastic bag.  Place a towel between your skin and the bag or between your cast and the bag.  Leave the ice on for 20 minutes, 2–3 times a day.  Remove the ice if your skin turns bright red. This is very important. If you cannot feel pain, heat, or cold, you have a greater risk of damage to the area.  Move your toes often to reduce stiffness and swelling.  Raise (elevate) the injured area above the level of your heart while you are sitting or lying down.  Activity    Do exercises as told by your health care provider.  Return to your normal activities as told by your health care provider. Ask your health care provider what activities are safe for you.  Do not use the injured limb to support your body weight until your health care provider says that you can. Use crutches as told by your health care provider.  General instructions    Take over-the-counter and prescription medicines only as told by your health care provider.  Ask your health care provider when it is safe to drive if you have a cast, boot, or splint on your ankle.  Do not use any products that contain nicotine or tobacco, such as cigarettes, e-cigarettes, and chewing tobacco. These can delay bone healing. If you need help quitting, ask your health care provider.  Keep all follow-up visits. This is important.  Contact a health care provider if:  You have pain or swelling that gets worse or does not get better with rest or medicine.  Your cast gets damaged.  Get help right away if:  You have severe pain that lasts.  You develop new pain or swelling.  Your skin or toenails below the injury turn blue or gray, feel cold, become numb, or are less sensitive to the touch.  Summary  An ankle fracture can be stable or unstable. This is determined after a physical exam and imaging studies such as X-rays, a CT scan, or an MRI.  Stable fractures are treated with a cast, boot, or splint to hold the ankle still until the fracture heals. Unstable fractures require surgery to ensure that the bones heal properly.  You will not be able to put weight on your injured side for several weeks.  Medicines, icing, and raising (elevating) your injured ankle when you are sitting or lying down may help with pain relief. Follow instructions as told by your health care provider.  This information is not intended to replace advice given to you by your health care provider. Make sure you discuss any questions you have with your health care provider.

## 2023-08-09 NOTE — ED ADULT NURSE REASSESSMENT NOTE - NS ED NURSE REASSESS COMMENT FT1
pt discharged in stable condition with . Pt wheeled out with crutches. Educated on how to use crutches and demonstrate back with good understanding. Vitals stable.

## 2023-08-09 NOTE — ED ADULT NURSE NOTE - NSFALLRISKINTERV_ED_ALL_ED
Assistance OOB with selected safe patient handling equipment if applicable/Assistance with ambulation/Communicate fall risk and risk factors to all staff, patient, and family/Monitor gait and stability/Provide visual cue: yellow wristband, yellow gown, etc/Reinforce activity limits and safety measures with patient and family/Call bell, personal items and telephone in reach/Instruct patient to call for assistance before getting out of bed/chair/stretcher/Non-slip footwear applied when patient is off stretcher/[place] to call system/Physically safe environment - no spills, clutter or unnecessary equipment/Purposeful Proactive Rounding/Room/bathroom lighting operational, light cord in reach

## 2023-08-09 NOTE — ED STATDOCS - CARE PROVIDER_API CALL
Fran Knight  Orthopaedic Surgery  [address] [place] [id number]  Phone: [phone]  Fax: [phone]  Follow Up Time: Urgent

## 2023-08-09 NOTE — ED STATDOCS - MUSCULOSKELETAL, MLM
range of motion is not limited and there is no muscle tenderness. RIGHT ANKLE: +STS. +Obvious deformity. +Moderate tenderness right lateral malleolus. Painful ROM, unable to fully bear weight. NVI. 2+ distal pulses.

## 2023-08-09 NOTE — ED STATDOCS - WET READ LAUNCH FT
There are no Wet Read(s) to document. Otezla Pregnancy And Lactation Text: This medication is Pregnancy Category C and it isn't known if it is safe during pregnancy. It is unknown if it is excreted in breast milk.

## 2023-08-09 NOTE — ED STATDOCS - NS ED ATTENDING STATEMENT MOD
This was a shared visit with the KAT. I reviewed and verified the documentation and independently performed the documented:

## 2023-08-13 ENCOUNTER — RESULT CHARGE (OUTPATIENT)
Age: 78
End: 2023-08-13

## 2023-08-14 ENCOUNTER — APPOINTMENT (OUTPATIENT)
Age: 78
End: 2023-08-14
Payer: MEDICARE

## 2023-08-14 ENCOUNTER — NON-APPOINTMENT (OUTPATIENT)
Age: 78
End: 2023-08-14

## 2023-08-14 VITALS
WEIGHT: 165 LBS | BODY MASS INDEX: 24.44 KG/M2 | HEIGHT: 69 IN | HEART RATE: 68 BPM | SYSTOLIC BLOOD PRESSURE: 130 MMHG | DIASTOLIC BLOOD PRESSURE: 81 MMHG

## 2023-08-14 DIAGNOSIS — Z01.818 ENCOUNTER FOR OTHER PREPROCEDURAL EXAMINATION: ICD-10-CM

## 2023-08-14 PROCEDURE — 99204 OFFICE O/P NEW MOD 45 MIN: CPT

## 2023-08-14 RX ORDER — DOCUSATE SODIUM 100 MG/1
100 CAPSULE ORAL 3 TIMES DAILY
Qty: 42 | Refills: 0 | Status: ACTIVE | COMMUNITY
Start: 2023-08-14 | End: 1900-01-01

## 2023-08-14 NOTE — PHYSICAL EXAM
[de-identified] : General: Alert and oriented x3. In no acute distress. Pleasant in nature with a normal affect. No apparent respiratory distress. Right Ankle Exam Skin: Clean, dry, intact Inspection: No obvious malalignment, no swelling, no effusion; no lymphadenopathy Pulses: 2+ DP/PT pulses ROM: Not tested  Tenderness: + tenderness over the lateral malleolus, no CFL/no ATFL/no PTFL pain. No medial malleolus pain, + deltoid ligament pain. + proximal fibular pain. No heel pain. Stability: Negative anterior/posterior drawer. Strength: 5/5 TA/GS/EHL Neuro: In tact to light touch throughout Additional tests: Negative Mortons test, Negative syndesmosis squeeze test. Negative Oleg's Sign. [de-identified] : 3 views of the right ankle were obtained on 8/9 at [facility] ED, reviewed by me today that show right distal fibula fracture. Stress view shows widening of the mortise at the medial malleolus suggesting deltoid ligament injury

## 2023-08-14 NOTE — ASSESSMENT
[FreeTextEntry1] : 78 year old female presents with right bimalleolar equivalent ankle fx s/p fall.  Nature and history of the condition was discussed at length. Risks and benefits of continued closed treatment versus surgical intervention with ORIF were discussed. Patient was tentatively booked for surgical fixation. She will remain in a well-fitting short leg cast for the interim, remain non weight bearing on the Right lower extremity, and refrain from driving. Patient to continue OTC NSAIDs as needed for pain control.  Consent for the surgical procedure read as follows, " right ankle ORIF bimalleolar fracture."  She will speak to my surgical coordinator Luz Tracy to get on the surgical schedule in the near future.  All risks, benefits and alternatives to the recommended surgical procedure were discussed which include but are not limited to bleeding, infection, nerve damage, vascular damage, failure of the wound to heal, the need for further surgery, loss of limb, DVT, PE, loss of life as well as the risks associated with general anesthesia. The patient verbalized understanding and provided informed consent to move forward with surgery.

## 2023-08-14 NOTE — HISTORY OF PRESENT ILLNESS
[FreeTextEntry1] : This is a 78 year old female who presents to the office for evaluation of her right ankle. She presents in a short leg cast and crutches, accompanied by her firiend. She states that last Wednesday 8/9 she was in [place], [place] when the ground gave out underneath her and she fell on top of her ankle. She states that she heard a pop when she fell. She came back to the area to be seen at [facility] where she was diagnosed with a fracture of her right distal fibula. She was given  qd for DVT ppx in the ER. She was told she would need surgical fixation for the ankle. Denies any other complaints at this time.

## 2023-08-16 RX ORDER — SODIUM CHLORIDE 9 MG/ML
1000 INJECTION, SOLUTION INTRAVENOUS
Refills: 0 | Status: DISCONTINUED | OUTPATIENT
Start: 2023-08-17 | End: 2023-08-17

## 2023-08-16 RX ORDER — OXYCODONE HYDROCHLORIDE 5 MG/1
5 TABLET ORAL ONCE
Refills: 0 | Status: DISCONTINUED | OUTPATIENT
Start: 2023-08-17 | End: 2023-08-17

## 2023-08-16 RX ORDER — ONDANSETRON 8 MG/1
4 TABLET, FILM COATED ORAL ONCE
Refills: 0 | Status: DISCONTINUED | OUTPATIENT
Start: 2023-08-17 | End: 2023-08-17

## 2023-08-16 RX ORDER — FENTANYL CITRATE 50 UG/ML
50 INJECTION INTRAVENOUS
Refills: 0 | Status: DISCONTINUED | OUTPATIENT
Start: 2023-08-17 | End: 2023-08-17

## 2023-08-16 NOTE — ASU PATIENT PROFILE, ADULT - FALL HARM RISK - HARM RISK INTERVENTIONS

## 2023-08-16 NOTE — ASU PATIENT PROFILE, ADULT - NSICDXPASTMEDICALHX_GEN_ALL_CORE_FT
PAST MEDICAL HISTORY:  Ankle pain, right closed fracture fibula distal    Essential hypertension     H/O neuropathy     History of Lyme disease     Hypothyroid     Rheumatoid disease     Sicca syndrome

## 2023-08-16 NOTE — ASU PATIENT PROFILE, ADULT - TEACHING/LEARNING CULTURAL CONSIDERATIONS
Discharge [facility] Case Management Department  Written by: Emma Quispe RN    Patient Name: Gabby Medellinyamilexfrank  Attending Provider: No att. providers found  Admit Date: 2021  8:24 AM  MRN: [id number]  Account: [de-identified]                     : 1954  Discharge Date: 2021      Disposition: home    Emma Quispe RN none

## 2023-08-17 ENCOUNTER — APPOINTMENT (OUTPATIENT)
Dept: ORTHOPEDIC SURGERY | Facility: HOSPITAL | Age: 78
End: 2023-08-17

## 2023-08-17 ENCOUNTER — OUTPATIENT (OUTPATIENT)
Dept: INPATIENT UNIT | Facility: HOSPITAL | Age: 78
LOS: 1 days | Discharge: HOME | End: 2023-08-17
Payer: MEDICARE

## 2023-08-17 ENCOUNTER — TRANSCRIPTION ENCOUNTER (OUTPATIENT)
Age: 78
End: 2023-08-17

## 2023-08-17 VITALS
SYSTOLIC BLOOD PRESSURE: 127 MMHG | OXYGEN SATURATION: 96 % | HEART RATE: 55 BPM | TEMPERATURE: 97 F | DIASTOLIC BLOOD PRESSURE: 57 MMHG | RESPIRATION RATE: 16 BRPM

## 2023-08-17 VITALS
TEMPERATURE: 98 F | HEIGHT: 69 IN | DIASTOLIC BLOOD PRESSURE: 88 MMHG | RESPIRATION RATE: 16 BRPM | WEIGHT: 184.97 LBS | SYSTOLIC BLOOD PRESSURE: 156 MMHG | HEART RATE: 98 BPM | OXYGEN SATURATION: 99 %

## 2023-08-17 DIAGNOSIS — S82.831A OTHER FRACTURE OF UPPER AND LOWER END OF RIGHT FIBULA, INITIAL ENCOUNTER FOR CLOSED FRACTURE: ICD-10-CM

## 2023-08-17 DIAGNOSIS — Z98.890 OTHER SPECIFIED POSTPROCEDURAL STATES: Chronic | ICD-10-CM

## 2023-08-17 PROCEDURE — 77071 MNL APPL STRS JT RADIOGRAPHY: CPT

## 2023-08-17 PROCEDURE — C1713: CPT

## 2023-08-17 PROCEDURE — 76000 FLUOROSCOPY <1 HR PHYS/QHP: CPT

## 2023-08-17 PROCEDURE — 27792 TREATMENT OF ANKLE FRACTURE: CPT | Mod: RT

## 2023-08-17 RX ORDER — ERGOCALCIFEROL 1.25 MG/1
1 CAPSULE ORAL
Refills: 0 | DISCHARGE

## 2023-08-17 RX ORDER — PIROXICAM 20 MG/1
1 CAPSULE ORAL
Refills: 0 | DISCHARGE

## 2023-08-17 RX ORDER — TRAMADOL HYDROCHLORIDE 50 MG/1
1 TABLET ORAL
Qty: 30 | Refills: 0
Start: 2023-08-17

## 2023-08-17 RX ORDER — MONTELUKAST 4 MG/1
1 TABLET, CHEWABLE ORAL
Refills: 0 | DISCHARGE

## 2023-08-17 RX ORDER — APREPITANT 80 MG/1
40 CAPSULE ORAL ONCE
Refills: 0 | Status: COMPLETED | OUTPATIENT
Start: 2023-08-17 | End: 2023-08-17

## 2023-08-17 RX ORDER — DOCUSATE SODIUM 100 MG
1 CAPSULE ORAL
Qty: 16 | Refills: 0
Start: 2023-08-17 | End: 2023-08-20

## 2023-08-17 RX ORDER — AMITRIPTYLINE HCL 25 MG
1 TABLET ORAL
Refills: 0 | DISCHARGE

## 2023-08-17 RX ORDER — IRBESARTAN AND HYDROCHLOROTHIAZIDE 12.5; 3 MG/1; MG/1
1 TABLET ORAL
Refills: 0 | DISCHARGE

## 2023-08-17 RX ORDER — ONDANSETRON 8 MG/1
1 TABLET, FILM COATED ORAL
Qty: 24 | Refills: 0
Start: 2023-08-17 | End: 2023-08-20

## 2023-08-17 RX ORDER — CYCLOBENZAPRINE HYDROCHLORIDE 10 MG/1
1 TABLET, FILM COATED ORAL
Refills: 0 | DISCHARGE

## 2023-08-17 RX ORDER — FOLIC ACID 0.8 MG
1 TABLET ORAL
Refills: 0 | DISCHARGE

## 2023-08-17 RX ORDER — ROSUVASTATIN CALCIUM 5 MG/1
1 TABLET ORAL
Refills: 0 | DISCHARGE

## 2023-08-17 RX ORDER — TRAMADOL HYDROCHLORIDE AND ACETAMINOPHEN 37.5; 325 MG/1; MG/1
1 TABLET ORAL
Refills: 0 | DISCHARGE

## 2023-08-17 RX ORDER — ASPIRIN/CALCIUM CARB/MAGNESIUM 324 MG
1 TABLET ORAL
Qty: 30 | Refills: 0
Start: 2023-08-17 | End: 2023-09-15

## 2023-08-17 RX ORDER — OXYCODONE HYDROCHLORIDE 5 MG/1
1 TABLET ORAL
Qty: 20 | Refills: 0
Start: 2023-08-17 | End: 2023-08-21

## 2023-08-17 RX ORDER — MISOPROSTOL 200 UG/1
1 TABLET ORAL
Refills: 0 | DISCHARGE

## 2023-08-17 RX ADMIN — APREPITANT 40 MILLIGRAM(S): 80 CAPSULE ORAL at 11:04

## 2023-08-17 NOTE — BRIEF OPERATIVE NOTE - NSICDXBRIEFPROCEDURE_GEN_ALL_CORE_FT
PROCEDURES:  Open reduction and internal fixation of bimalleolar fracture of right ankle 17-Aug-2023 12:40:04 R fibular ORIF Byron Valle

## 2023-08-17 NOTE — BRIEF OPERATIVE NOTE - CONDITION POST OP
stable
Airway patent, Nasal mucosa clear. Mouth with normal mucosa. Throat has no vesicles, no oropharyngeal exudates and uvula is midline.

## 2023-08-17 NOTE — ASU DISCHARGE PLAN (ADULT/PEDIATRIC) - NS MD DC FALL RISK RISK
For information on Fall & Injury Prevention, visit: [email or web address] OR  [email or web address] OR  https://www.cdc.gov/steadi/patient.html

## 2023-08-17 NOTE — ASU DISCHARGE PLAN (ADULT/PEDIATRIC) - CARE PROVIDER_API CALL
Aj Barnes  Orthopaedic Surgery  [address] [place] [address]  Phone: [phone]  Fax: [phone]  Follow Up Time:

## 2023-08-17 NOTE — ASU DISCHARGE PLAN (ADULT/PEDIATRIC) - ASU DC SPECIAL INSTRUCTIONSFT
Post-Operative Instructions    PRESCRIPTIONS: your post-operative medications have been handed to you or sent to the pharmacy you indicated at your pre-operative visit.  If you have any difficulty obtaining your post-operative medications or have any questions, please call the office at [phone].      PAIN MANAGEMENT: You should expect to have discomfort for the first week or so after surgery. Pain medication should be taken to help alleviate the pain so that you are comfortable and can participate in physical therapy.  Take the medication as directed.  You may decrease the amount of pain medication, as tolerated, when pain improves.  You must exercise caution when operating a motor vehicle. You have been prescribed one or more of the following as indicated on your Med Rec form thta the Nurse will go over with you:  [  ] Oxycodone 5mg 1-2 tablets by mouth every 4 to 6 hours as needed for pain  Oxycodone is a short-acting pain medication routinely prescribed after surgery.  It is the pain medication found in “Percocet,” which is a combination of oxycodone and Tylenol.  If you were prescribed oxycodone, you may take Tylenol in addition to this medication, if needed for pain control.    NAUSEA: Nausea is a common side effect of anesthesia and pain medications.  You may take the below medication if you are experiencing nausea after surgery.  If you continue to experience nausea or vomiting more than 24 hours after surgery, please call the office.  [  ] Ondansetron 4mg by mouth every 4 hours as needed for nausea    CONSTIPATION: common side effect of anesthesia and pain medications.  You should take Colace three times daily, as long as you are taking narcotic pain medications after surgery, such as oxycodone, oxycontin, vicodin, or norco.  [  ] Colace 100mg by mouth three times daily    DVT PROPHYLAXIS (PREVENTION OF BLOOD CLOTS): Aspirin EC can reduce the risk of blood clots after surgery, particularly after surgery on the legs, ankles and feet.  If you have been prescribed Aspirn, it is essential that you take this medication.  If you already are on an anticoagulant (blood thinner) such as Xarelto, Coumadin, Warfarin, Eliquis - you should resume you home "blood-thinner" in place of the Aspirn.  [  ] Aspirin 325mg ENTERIC COATED (EC) by mouth once daily for 2-4 weeks (depending on surgical procedure)    ACTIVITY: You should be up and moving as much and as often as possible! Do NOT walk or put bodyweight on your splint or surgical side. Use Crutches or walker. You must keep your bandage/splint dry. Do NOT get it wet. IF you shower it MUST be covered tightly with a garbage bag. Otherwise sponge bath is advised. You do NOT want wet bandages on your skin as they can cause skin breakdown under the splint.    BANDAGE: Your bandage will be chaged in the office. Do NOT remove it yourself. IF it gets damaged or wet (soaked) call. Follow up about 7-10 days in office or as otherwise advised by Dr. Barnes if different. Post-Operative Instructions    PRESCRIPTIONS: your post-operative medications have been handed to you or sent to the pharmacy you indicated at your pre-operative visit.  If you have any difficulty obtaining your post-operative medications or have any questions, please call the office at [phone].      PAIN MANAGEMENT: You should expect to have discomfort for the first week or so after surgery. Pain medication should be taken to help alleviate the pain so that you are comfortable and can participate in physical therapy.  Take the medication as directed.  You may decrease the amount of pain medication, as tolerated, when pain improves.  You must exercise caution when operating a motor vehicle. You have been prescribed one or more of the following as indicated on your Med Rec form thta the Nurse will go over with you:  [  ] Tramadol 50mg 1-2 tablets by mouth every 4 to 6 hours as needed for pain    NAUSEA: Nausea is a common side effect of anesthesia and pain medications.  You may take the below medication if you are experiencing nausea after surgery.  If you continue to experience nausea or vomiting more than 24 hours after surgery, please call the office.  [  ] Ondansetron 4mg by mouth every 4 hours as needed for nausea    CONSTIPATION: common side effect of anesthesia and pain medications.  You should take Colace three times daily, as long as you are taking narcotic pain medications after surgery, such as oxycodone, oxycontin, vicodin, or norco.  [  ] Colace 100mg by mouth three times daily    DVT PROPHYLAXIS (PREVENTION OF BLOOD CLOTS): Aspirin EC can reduce the risk of blood clots after surgery, particularly after surgery on the legs, ankles and feet.  If you have been prescribed Aspirn, it is essential that you take this medication.  If you already are on an anticoagulant (blood thinner) such as Xarelto, Coumadin, Warfarin, Eliquis - you should resume you home "blood-thinner" in place of the Aspirn.  [  ] Aspirin 325mg ENTERIC COATED (EC) by mouth once daily until told otherwise by Dr Barnes    ACTIVITY: You should be up and moving as much and as often as possible! Do NOT walk or put bodyweight on your splint or surgical side. Use Crutches or walker. You must keep your bandage/splint dry. Do NOT get it wet. IF you shower it MUST be covered tightly with a garbage bag. Otherwise sponge bath is advised. You do NOT want wet bandages on your skin as they can cause skin breakdown under the splint.    BANDAGE: Your bandage will be changed in the office. Do NOT remove it yourself. IF it gets damaged or wet (soaked) call. Follow up about 7-10 days in office or as otherwise advised by Dr. Barnes if different.

## 2023-08-22 DIAGNOSIS — E03.9 HYPOTHYROIDISM, UNSPECIFIED: ICD-10-CM

## 2023-08-22 DIAGNOSIS — Z88.5 ALLERGY STATUS TO NARCOTIC AGENT: ICD-10-CM

## 2023-08-22 DIAGNOSIS — S82.61XA DISPLACED FRACTURE OF LATERAL MALLEOLUS OF RIGHT FIBULA, INITIAL ENCOUNTER FOR CLOSED FRACTURE: ICD-10-CM
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
## 2023-08-22 DIAGNOSIS — M06.9 RHEUMATOID ARTHRITIS, UNSPECIFIED: ICD-10-CM
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
## 2023-08-22 DIAGNOSIS — Z88.0 ALLERGY STATUS TO PENICILLIN: ICD-10-CM

## 2023-08-22 DIAGNOSIS — E78.00 PURE HYPERCHOLESTEROLEMIA, UNSPECIFIED: ICD-10-CM
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
## 2023-08-22 DIAGNOSIS — G62.9 POLYNEUROPATHY, UNSPECIFIED: ICD-10-CM

## 2023-08-31 ENCOUNTER — APPOINTMENT (OUTPATIENT)
Dept: ORTHOPEDIC SURGERY | Facility: CLINIC | Age: 78
End: 2023-08-31
Payer: MEDICARE

## 2023-08-31 PROCEDURE — 99024 POSTOP FOLLOW-UP VISIT: CPT

## 2023-08-31 PROCEDURE — 73610 X-RAY EXAM OF ANKLE: CPT | Mod: RT

## 2023-08-31 RX ORDER — ASPIRIN/ACETAMINOPHEN/CAFFEINE 500-325-65
325 POWDER IN PACKET (EA) ORAL
Qty: 30 | Refills: 2 | Status: ACTIVE | COMMUNITY
Start: 2023-08-31 | End: 1900-01-01

## 2023-08-31 NOTE — ADDENDUM
[FreeTextEntry1] : I, NANCY JAVIER, acted solely as a scribe for Dr. Aj Barnes on this date 08/31/2023  .   All medical record entries made by the Scribe were at my, Dr. Aj Barnes, direction and personally dictated by me on 08/31/2023 . I have reviewed the chart and agree that the record accurately reflects my personal performance of the history, physical exam, assessment and plan. I have also personally directed, reviewed, and agreed with the chart.

## 2023-08-31 NOTE — HISTORY OF PRESENT ILLNESS
[___ Days Post Op] : post op day #[unfilled] [2] : the patient reports pain that is 2/10 in severity [Chills] : no chills [Fever] : no fever [Nausea] : no nausea [Vomiting] : no vomiting [Clean/Dry/Intact] : clean, dry and intact [Healed] : healed [Erythema] : not erythematous [Discharge] : absent of discharge [Swelling] : swollen [Dehiscence] : not dehisced [Xray (Date:___)] : [unfilled] Xray -  [Doing Well] : is doing well [Excellent Pain Control] : has excellent pain control [No Sign of Infection] : is showing no signs of infection [Sutures Removed] : sutures were removed [de-identified] : 78 year year old female present in the office 2 weeks s/p from S/p Open reduction and internal fixation of bimalleolar fracture of right ankle on 8/17/23. The patient's pain is noted to be a 2/10. She is compliant with aspirin for DVT prophylaxis. She is not currently taking any pain medication. No other complaints at this time.  [de-identified] : S/p Open reduction and internal fixation of bimalleolar fracture of right ankle on 8/17/23  [de-identified] : General: Alert and oriented x3. In no acute distress. Pleasant in nature with a normal affect. No apparent respiratory distress. The wound is intact. No evidence of any diastases or dehiscence. No surrounding erythema noted. No fluctuance. The area is warm and well perfused. The patient is able to wiggle their toes. Neurovascularly intact.  [de-identified] : 3V of the right ankle were ordered, obtained and reviewed by me today, 08/31/2023, revealed: hardware in good alignment [de-identified] : S/p Open reduction and internal fixation of bimalleolar fracture of right ankle on 8/17/23  [de-identified] : Patient is a 78 year year old female present in the office today 2 weeks s/p S/p Open reduction and internal fixation of bimalleolar fracture of right ankle on 8/17/23. I recommend that the patient utilize ice, NSAIDS PRN, and heat. They can also elevate their RLE above the level of the heart.   Sutures were removed in the office today and incision site was cleaned and redressed. Patient tolerated well. Patient advised not to submerge the wound in water for 5 days. Further wound care discussed.  I recommended that the patient utilize a CAM boot. The patient was fitted for the CAM boot in the office today. The patient was educated about the boot wear pattern and utilization, as well as the timeframe to come out of the boot. She was also given full instructions for using the boot. I stressed to the patient the importance of remaining NWB.    I advised the patient that on a long drive, she should take frequent breaks to avoid a clot. Continue with the aspirin for DVT prophylaxis.    I have addressed all the patient's concerns surrounding the pathology of their condition. The patient understood and verbally agreed to the treatment plan. All of their questions were answered and they were satisfied with the visit. The patient should call the office if they have any questions or experience worsening symptoms.   Follow up in 2 weeks.

## 2023-09-05 PROBLEM — E03.9 HYPOTHYROIDISM, UNSPECIFIED: Chronic | Status: ACTIVE | Noted: 2023-08-16

## 2023-09-05 PROBLEM — Z86.69 PERSONAL HISTORY OF OTHER DISEASES OF THE NERVOUS SYSTEM AND SENSE ORGANS: Chronic | Status: ACTIVE | Noted: 2023-08-16

## 2023-09-05 PROBLEM — M35.00 SJOGREN SYNDROME, UNSPECIFIED: Chronic | Status: ACTIVE | Noted: 2023-08-16

## 2023-09-05 PROBLEM — Z86.19 PERSONAL HISTORY OF OTHER INFECTIOUS AND PARASITIC DISEASES: Chronic | Status: ACTIVE | Noted: 2023-08-16

## 2023-09-05 PROBLEM — M06.9 RHEUMATOID ARTHRITIS, UNSPECIFIED: Chronic | Status: ACTIVE | Noted: 2023-08-16

## 2023-09-05 PROBLEM — M25.571 PAIN IN RIGHT ANKLE AND JOINTS OF RIGHT FOOT: Chronic | Status: ACTIVE | Noted: 2023-08-16

## 2023-09-15 ENCOUNTER — APPOINTMENT (OUTPATIENT)
Dept: ORTHOPEDIC SURGERY | Facility: CLINIC | Age: 78
End: 2023-09-15
Payer: MEDICARE

## 2023-09-15 PROCEDURE — 73610 X-RAY EXAM OF ANKLE: CPT | Mod: RT

## 2023-09-15 PROCEDURE — 99024 POSTOP FOLLOW-UP VISIT: CPT

## 2023-09-15 RX ORDER — ASPIRIN 81 MG/1
81 TABLET ORAL DAILY
Qty: 30 | Refills: 0 | Status: ACTIVE | COMMUNITY
Start: 2023-09-15 | End: 1900-01-01

## 2023-09-29 ENCOUNTER — APPOINTMENT (OUTPATIENT)
Dept: ORTHOPEDIC SURGERY | Facility: CLINIC | Age: 78
End: 2023-09-29
Payer: MEDICARE

## 2023-09-29 PROCEDURE — 73610 X-RAY EXAM OF ANKLE: CPT | Mod: RT

## 2023-09-29 PROCEDURE — 99024 POSTOP FOLLOW-UP VISIT: CPT

## 2024-01-03 ENCOUNTER — APPOINTMENT (OUTPATIENT)
Dept: ORTHOPEDIC SURGERY | Facility: CLINIC | Age: 79
End: 2024-01-03
Payer: MEDICARE

## 2024-01-03 DIAGNOSIS — G62.9 POLYNEUROPATHY, UNSPECIFIED: ICD-10-CM

## 2024-01-03 DIAGNOSIS — S82.831A OTHER FRACTURE OF UPPER AND LOWER END OF RIGHT FIBULA, INITIAL ENCOUNTER FOR CLOSED FRACTURE: ICD-10-CM

## 2024-01-03 PROCEDURE — 99213 OFFICE O/P EST LOW 20 MIN: CPT

## 2024-01-03 PROCEDURE — 73610 X-RAY EXAM OF ANKLE: CPT | Mod: RT

## 2024-01-03 NOTE — DISCUSSION/SUMMARY
[de-identified] : Today I had a lengthy discussion with the patient regarding their right ankle. I have addressed all the patient's concerns surrounding the pathology of their condition.  I have reviewed the patient's XR imaging with them in great detail.  I recommend the patient utilize an ankle sleeve and to wean out of the ASO brace. RICE therapy as needed. I recommend the patient undergo a course of physical therapy for the right ankle 2-3 times a week for a total of 8-12 weeks. A prescription was given for the physical therapy today.   The patient understood and verbally agreed to the treatment plan. All of their questions were answered, and they were satisfied with the visit. The patient should call the office if they have any questions or experience worsening symptoms.

## 2024-01-03 NOTE — HISTORY OF PRESENT ILLNESS
[FreeTextEntry1] : S/P ORIF right ankle bimalleolar fracture DOS: 08/17/2023.  01/03/2024: SELVIN JACK is a 78 year old female presenting to the office for a follow up evaluation of her right ankle, s/p ORIF. She reports that she is doing well with improved symptoms since her last visit. She presents to the office in sneakers and ambulating with a cane for balance.

## 2024-01-03 NOTE — PHYSICAL EXAM
[de-identified] : General: Alert and oriented x3. In no acute distress. Pleasant in nature with a normal affect. No apparent respiratory distress.  RIGHT Ankle Exam  Skin: Clean, dry, intact Inspection: No obvious malalignment, no swelling, no effusion; no lymphadenopathy Pulses: 2+ DP/PT pulses ROM: R Ankle 0 degrees of dorsiflexion, 30 degrees of plantarflexion, 10 degrees of subtalar motion Tenderness: No tenderness over the lateral malleolus, no CFL/ATFL/PTFL pain. No medial malleolus pain, no deltoid ligament pain. No proximal fibular pain. No heel pain. Stability: Negative anterior/posterior drawer. Strength: 5/5 TA/GS/EHL Neuro: In tact to light touch throughout Additional tests: Negative Mortons test, Negative syndesmosis squeeze test.  [de-identified] : 3V of the right ankle were ordered, obtained and reviewed by me today, 01/03/2024, revealed: hardware intact and in good alignment.

## 2024-01-03 NOTE — REASON FOR VISIT
ONCOLOGIC HISTORY:   1. Norman sarcoma.   2. Osteonecrosis of the right hip.     INTERVAL HISTORY:   Abhishek presents to the office for his monthly pain assessment and medication refill. He has been having some increased pain with the change in weather bu generally has been feeling about the same. His pain is at a 7-8 today.  He has no new areas of pain. He denies any nausea or vomiting, fevers, chills, bleeding or bruising. He says that after his conversation with Dr. Vasquez last month he is reconsidering surgical options again.    ALLERGIES AND MEDICATIONS:   Per Epic review.     PAST MEDICAL, SURGICAL, SOCIAL AND FAMILY HISTORY:   Reviewed and are unchanged.     REVIEW OF SYSTEMS:   As documented in Epic was reviewed.     Distress Score   Distress Management Group      Distress Scale (0-10)      PHYSICAL EXAMINATION:   Blood pressure 127/80, pulse 98, temperature 98.8 °F (37.1 °C), temperature source Oral, resp. rate 14, height 5' 7\" (1.702 m), weight 114.8 kg, SpO2 96 %.    CONSTITUTIONAL: He is awake, alert, and oriented and appears in no acute distress.   HEENT: Normocephalic, atraumatic. Eyes sclerae white. Conjunctivae clear. Extraocular movements are intact.   NECK: Soft and supple.   CHEST: Lungs clear.   HEART: Regular rate and rhythm.   ABDOMEN: Soft, nontender to palpation.   MUSCULOSKELETAL: Extremities warm. No clubbing or cyanosis.  NEUROLOGIC: No deficits noted.     WBC (K/mcL)   Date Value   12/13/2017 7.4     RBC (mil/mcL)   Date Value   12/13/2017 4.60     HCT (%)   Date Value   12/13/2017 40.2     HGB (g/dL)   Date Value   12/13/2017 13.4     PLT   Date Value   12/13/2017 312 K/mcL   10/19/2013 271 K/mcL   01/06/2012 286 K/uL     IMPRESSION:   1. Norman sarcoma.   2. Osteonecrosis of the right hip.     PDMP reviewed; therapy appropriate, no aberrant behavior identified, prescriptions given.      PLAN:   Continue monthly face to face eval for narcotic pain refills. His pain medications were refilled  today. He takes OxyContin 120 mg p.o. b.i.d. along with oxycodone 5 mg p.r.n. Take medications as directed. Follow-up appointments were scheduled and reviewed. He is encouraged to call with any interval complaints or concerns. He demonstrates a good understanding of all this information and is in agreement with the plan.     ESDRAS MILNER DNP, APRN-BC         [Follow-Up Visit] : a follow-up visit for [FreeTextEntry2] : s/p ORIF right ankle bimalleolar fx.

## 2024-01-03 NOTE — ADDENDUM
[FreeTextEntry1] : I, NANCY JAVIER, acted solely as a scribe for Dr. Aj Barnes on this date 01/03/2024  .   All medical record entries made by the Scribe were at my, Dr. Aj Barnes, direction and personally dictated by me on 01/03/2024 . I have reviewed the chart and agree that the record accurately reflects my personal performance of the history, physical exam, assessment and plan. I have also personally directed, reviewed, and agreed with the chart.

## 2024-03-04 ENCOUNTER — EMERGENCY (EMERGENCY)
Facility: HOSPITAL | Age: 79
LOS: 0 days | Discharge: HOME | End: 2024-03-04
Attending: EMERGENCY MEDICINE
Payer: MEDICARE

## 2024-03-04 VITALS
SYSTOLIC BLOOD PRESSURE: 140 MMHG | DIASTOLIC BLOOD PRESSURE: 71 MMHG | TEMPERATURE: 99 F | OXYGEN SATURATION: 100 % | HEART RATE: 83 BPM | RESPIRATION RATE: 20 BRPM

## 2024-03-04 VITALS — WEIGHT: 169.98 LBS | HEIGHT: 69 IN

## 2024-03-04 DIAGNOSIS — Z98.890 OTHER SPECIFIED POSTPROCEDURAL STATES: Chronic | ICD-10-CM

## 2024-03-04 DIAGNOSIS — M35.00 SJOGREN SYNDROME, UNSPECIFIED: ICD-10-CM

## 2024-03-04 DIAGNOSIS — Z88.5 ALLERGY STATUS TO NARCOTIC AGENT: ICD-10-CM

## 2024-03-04 DIAGNOSIS — R19.7 DIARRHEA, UNSPECIFIED: ICD-10-CM

## 2024-03-04 DIAGNOSIS — D72.829 ELEVATED WHITE BLOOD CELL COUNT, UNSPECIFIED: ICD-10-CM

## 2024-03-04 DIAGNOSIS — I10 ESSENTIAL (PRIMARY) HYPERTENSION: ICD-10-CM

## 2024-03-04 DIAGNOSIS — E03.9 HYPOTHYROIDISM, UNSPECIFIED: ICD-10-CM

## 2024-03-04 DIAGNOSIS — M06.9 RHEUMATOID ARTHRITIS, UNSPECIFIED: ICD-10-CM

## 2024-03-04 DIAGNOSIS — R11.2 NAUSEA WITH VOMITING, UNSPECIFIED: ICD-10-CM

## 2024-03-04 DIAGNOSIS — R10.9 UNSPECIFIED ABDOMINAL PAIN: ICD-10-CM

## 2024-03-04 DIAGNOSIS — Z88.8 ALLERGY STATUS TO OTHER DRUGS, MEDICAMENTS AND BIOLOGICAL SUBSTANCES: ICD-10-CM

## 2024-03-04 DIAGNOSIS — R00.0 TACHYCARDIA, UNSPECIFIED: ICD-10-CM

## 2024-03-04 DIAGNOSIS — K42.9 UMBILICAL HERNIA WITHOUT OBSTRUCTION OR GANGRENE: ICD-10-CM

## 2024-03-04 DIAGNOSIS — E87.6 HYPOKALEMIA: ICD-10-CM

## 2024-03-04 DIAGNOSIS — Z88.0 ALLERGY STATUS TO PENICILLIN: ICD-10-CM

## 2024-03-04 DIAGNOSIS — E83.39 OTHER DISORDERS OF PHOSPHORUS METABOLISM: ICD-10-CM

## 2024-03-04 DIAGNOSIS — E83.42 HYPOMAGNESEMIA: ICD-10-CM

## 2024-03-04 LAB
ALBUMIN SERPL ELPH-MCNC: 3.8 G/DL — SIGNIFICANT CHANGE UP (ref 3.3–5)
ALP SERPL-CCNC: 144 U/L — HIGH (ref 40–120)
ALT FLD-CCNC: 45 U/L — SIGNIFICANT CHANGE UP (ref 12–78)
ANION GAP SERPL CALC-SCNC: 12 MMOL/L — SIGNIFICANT CHANGE UP (ref 5–17)
AST SERPL-CCNC: 31 U/L — SIGNIFICANT CHANGE UP (ref 15–37)
BASOPHILS # BLD AUTO: 0.03 K/UL — SIGNIFICANT CHANGE UP (ref 0–0.2)
BASOPHILS NFR BLD AUTO: 0.3 % — SIGNIFICANT CHANGE UP (ref 0–2)
BILIRUB SERPL-MCNC: 1.1 MG/DL — SIGNIFICANT CHANGE UP (ref 0.2–1.2)
BUN SERPL-MCNC: 17 MG/DL — SIGNIFICANT CHANGE UP (ref 7–23)
CALCIUM SERPL-MCNC: 9.7 MG/DL — SIGNIFICANT CHANGE UP (ref 8.5–10.1)
CHLORIDE SERPL-SCNC: 103 MMOL/L — SIGNIFICANT CHANGE UP (ref 96–108)
CO2 SERPL-SCNC: 21 MMOL/L — LOW (ref 22–31)
CREAT SERPL-MCNC: 1.24 MG/DL — SIGNIFICANT CHANGE UP (ref 0.5–1.3)
EGFR: 45 ML/MIN/1.73M2 — LOW
EOSINOPHIL # BLD AUTO: 0.01 K/UL — SIGNIFICANT CHANGE UP (ref 0–0.5)
EOSINOPHIL NFR BLD AUTO: 0.1 % — SIGNIFICANT CHANGE UP (ref 0–6)
GLUCOSE SERPL-MCNC: 150 MG/DL — HIGH (ref 70–99)
HCT VFR BLD CALC: 38.1 % — SIGNIFICANT CHANGE UP (ref 34.5–45)
HGB BLD-MCNC: 13.9 G/DL — SIGNIFICANT CHANGE UP (ref 11.5–15.5)
IMM GRANULOCYTES NFR BLD AUTO: 0.4 % — SIGNIFICANT CHANGE UP (ref 0–0.9)
LIDOCAIN IGE QN: 20 U/L — SIGNIFICANT CHANGE UP (ref 13–75)
LYMPHOCYTES # BLD AUTO: 1.2 K/UL — SIGNIFICANT CHANGE UP (ref 1–3.3)
LYMPHOCYTES # BLD AUTO: 10.7 % — LOW (ref 13–44)
MAGNESIUM SERPL-MCNC: 1.2 MG/DL — LOW (ref 1.6–2.6)
MCHC RBC-ENTMCNC: 32 PG — SIGNIFICANT CHANGE UP (ref 27–34)
MCHC RBC-ENTMCNC: 36.5 GM/DL — HIGH (ref 32–36)
MCV RBC AUTO: 87.8 FL — SIGNIFICANT CHANGE UP (ref 80–100)
MONOCYTES # BLD AUTO: 0.65 K/UL — SIGNIFICANT CHANGE UP (ref 0–0.9)
MONOCYTES NFR BLD AUTO: 5.8 % — SIGNIFICANT CHANGE UP (ref 2–14)
NEUTROPHILS # BLD AUTO: 9.32 K/UL — HIGH (ref 1.8–7.4)
NEUTROPHILS NFR BLD AUTO: 82.7 % — HIGH (ref 43–77)
PHOSPHATE SERPL-MCNC: 1.4 MG/DL — LOW (ref 2.5–4.5)
PLATELET # BLD AUTO: 280 K/UL — SIGNIFICANT CHANGE UP (ref 150–400)
POTASSIUM SERPL-MCNC: 3 MMOL/L — LOW (ref 3.5–5.3)
POTASSIUM SERPL-SCNC: 3 MMOL/L — LOW (ref 3.5–5.3)
PROT SERPL-MCNC: 7.6 GM/DL — SIGNIFICANT CHANGE UP (ref 6–8.3)
RBC # BLD: 4.34 M/UL — SIGNIFICANT CHANGE UP (ref 3.8–5.2)
RBC # FLD: 11.7 % — SIGNIFICANT CHANGE UP (ref 10.3–14.5)
SODIUM SERPL-SCNC: 136 MMOL/L — SIGNIFICANT CHANGE UP (ref 135–145)
WBC # BLD: 11.26 K/UL — HIGH (ref 3.8–10.5)
WBC # FLD AUTO: 11.26 K/UL — HIGH (ref 3.8–10.5)

## 2024-03-04 PROCEDURE — 85025 COMPLETE CBC W/AUTO DIFF WBC: CPT

## 2024-03-04 PROCEDURE — 96374 THER/PROPH/DIAG INJ IV PUSH: CPT | Mod: XU

## 2024-03-04 PROCEDURE — 74177 CT ABD & PELVIS W/CONTRAST: CPT | Mod: 26,MC

## 2024-03-04 PROCEDURE — 83690 ASSAY OF LIPASE: CPT

## 2024-03-04 PROCEDURE — 99285 EMERGENCY DEPT VISIT HI MDM: CPT | Mod: 25

## 2024-03-04 PROCEDURE — 36415 COLL VENOUS BLD VENIPUNCTURE: CPT

## 2024-03-04 PROCEDURE — 93010 ELECTROCARDIOGRAM REPORT: CPT

## 2024-03-04 PROCEDURE — 80053 COMPREHEN METABOLIC PANEL: CPT

## 2024-03-04 PROCEDURE — 99285 EMERGENCY DEPT VISIT HI MDM: CPT

## 2024-03-04 PROCEDURE — 84100 ASSAY OF PHOSPHORUS: CPT

## 2024-03-04 PROCEDURE — 96375 TX/PRO/DX INJ NEW DRUG ADDON: CPT

## 2024-03-04 PROCEDURE — 93005 ELECTROCARDIOGRAM TRACING: CPT

## 2024-03-04 PROCEDURE — 74177 CT ABD & PELVIS W/CONTRAST: CPT | Mod: MC

## 2024-03-04 PROCEDURE — 83735 ASSAY OF MAGNESIUM: CPT

## 2024-03-04 RX ORDER — KETOROLAC TROMETHAMINE 30 MG/ML
30 SYRINGE (ML) INJECTION ONCE
Refills: 0 | Status: DISCONTINUED | OUTPATIENT
Start: 2024-03-04 | End: 2024-03-04

## 2024-03-04 RX ORDER — MAGNESIUM SULFATE 500 MG/ML
2 VIAL (ML) INJECTION ONCE
Refills: 0 | Status: COMPLETED | OUTPATIENT
Start: 2024-03-04 | End: 2024-03-04

## 2024-03-04 RX ORDER — SODIUM,POTASSIUM PHOSPHATES 278-250MG
2 POWDER IN PACKET (EA) ORAL ONCE
Refills: 0 | Status: COMPLETED | OUTPATIENT
Start: 2024-03-04 | End: 2024-03-04

## 2024-03-04 RX ORDER — SODIUM CHLORIDE 9 MG/ML
1000 INJECTION INTRAMUSCULAR; INTRAVENOUS; SUBCUTANEOUS ONCE
Refills: 0 | Status: COMPLETED | OUTPATIENT
Start: 2024-03-04 | End: 2024-03-04

## 2024-03-04 RX ORDER — ONDANSETRON 8 MG/1
4 TABLET, FILM COATED ORAL ONCE
Refills: 0 | Status: COMPLETED | OUTPATIENT
Start: 2024-03-04 | End: 2024-03-04

## 2024-03-04 RX ORDER — ACETAMINOPHEN 500 MG
1000 TABLET ORAL ONCE
Refills: 0 | Status: COMPLETED | OUTPATIENT
Start: 2024-03-04 | End: 2024-03-04

## 2024-03-04 RX ORDER — ONDANSETRON 8 MG/1
1 TABLET, FILM COATED ORAL
Qty: 28 | Refills: 0
Start: 2024-03-04 | End: 2024-03-10

## 2024-03-04 RX ORDER — BENZOCAINE AND MENTHOL 5; 1 G/100ML; G/100ML
1 LIQUID ORAL ONCE
Refills: 0 | Status: COMPLETED | OUTPATIENT
Start: 2024-03-04 | End: 2024-03-04

## 2024-03-04 RX ADMIN — BENZOCAINE AND MENTHOL 1 LOZENGE: 5; 1 LIQUID ORAL at 21:07

## 2024-03-04 RX ADMIN — ONDANSETRON 4 MILLIGRAM(S): 8 TABLET, FILM COATED ORAL at 18:25

## 2024-03-04 RX ADMIN — Medication 2 PACKET(S): at 20:33

## 2024-03-04 RX ADMIN — Medication 400 MILLIGRAM(S): at 18:24

## 2024-03-04 RX ADMIN — Medication 30 MILLIGRAM(S): at 21:05

## 2024-03-04 RX ADMIN — SODIUM CHLORIDE 1000 MILLILITER(S): 9 INJECTION INTRAMUSCULAR; INTRAVENOUS; SUBCUTANEOUS at 18:24

## 2024-03-04 RX ADMIN — Medication 25 GRAM(S): at 19:41

## 2024-03-04 NOTE — ED STATDOCS - CARE PROVIDER_API CALL
Fernando Chowdary  Gastroenterology  [facility], [address] [address]  Phone: [phone]  Fax: [phone]  Follow Up Time:

## 2024-03-04 NOTE — ED ADULT NURSE NOTE - NSFALLUNIVINTERV_ED_ALL_ED
Bed/Stretcher in lowest position, wheels locked, appropriate side rails in place/Call bell, personal items and telephone in reach/Instruct patient to call for assistance before getting out of bed/chair/stretcher/Non-slip footwear applied when patient is off stretcher/[place] to call system/Physically safe environment - no spills, clutter or unnecessary equipment/Purposeful proactive rounding/Room/bathroom lighting operational, light cord in reach

## 2024-03-04 NOTE — ED ADULT NURSE NOTE - OBJECTIVE STATEMENT
pt complaining of diarrhea x5 weeks, vomiting and abdominal pain today. pt has been taking imodium. denies any other medical complaints. pt presents to the ER c/o diarrhea x5 weeks. endorses vomiting and abdominal pain today. pt has been taking imodium. denies any other medical complaints.

## 2024-03-04 NOTE — ED STATDOCS - PATIENT PORTAL LINK FT
You can access the FollowMyHealth Patient Portal offered by [facility] by registering at the following website: [email or web address]. By joining [facility]’s FollowMyHealth portal, you will also be able to view your health information using other applications (apps) compatible with our system.

## 2024-03-04 NOTE — ED STATDOCS - NSFOLLOWUPINSTRUCTIONS_ED_ALL_ED_FT
Nausea medication was sent to pharmacy, please take as directed.    Recommend bland diet.    Can take Tylenol or Motrin as needed for any abdominal pain.  Can also use over-the-counter antacids, Maalox.    Please follow-up closely with your primary care doctor, and gastroenterologist.  If you do not have a gastroenterologist referral is given in your paperwork.    Recommend return to the ED if you have any worsening symptoms.

## 2024-03-04 NOTE — ED ADULT TRIAGE NOTE - CHIEF COMPLAINT QUOTE
pt complaining of diarrhea x5 weeks, vomiting and abdominal pain today. pt has been taking immodium.

## 2024-03-04 NOTE — ED ADULT NURSE NOTE - NSSEPSISSUSPECTED_ED_A_ED
Large Joint Aspiration/Injection: R knee  Date/Time: 10/5/2018 3:50 PM  Performed by: Ning Hutchison MD  Authorized by: Ning Hutchison MD     Consent Done?:  Yes (Verbal)  Indications:  Pain    Location:  Knee  Site:  R knee  Ultrasonic Guidance for needle placement: No  Needle size:  22 G  Approach:  Anteromedial  Medications:  40 mg triamcinolone acetonide 40 mg/mL  Patient tolerance:  Patient tolerated the procedure well with no immediate complications       No

## 2024-03-04 NOTE — ED STATDOCS - CARE PLAN
Principal Discharge DX:	Nausea vomiting and diarrhea  Secondary Diagnosis:	Abdominal pain  Secondary Diagnosis:	Hypomagnesemia  Secondary Diagnosis:	Hypophosphatemia   1

## 2024-03-04 NOTE — ED STATDOCS - OBJECTIVE STATEMENT
78-year-old female with a past medical history of rheumatoid disease, sicca syndrome, hypothyroidism, hypertension, who presents with chief complaint of nausea vomiting diarrhea and abdominal pain for 5 weeks.  Patient states this has been ongoing every day for the last 5 weeks. Patient states that she saw her PCP Dr. Rich, who tested her stool and her blood and and told her that everything was normal.  States that PCP told her to take over-the-counter medication for symptoms. States she has been has been taking Imodium for diarrhea.  Patient states symptoms feel worse today, came to the ED for further evaluation management.

## 2024-03-04 NOTE — ED STATDOCS - CLINICAL SUMMARY MEDICAL DECISION MAKING FREE TEXT BOX
In summary this is a 78-year-old female who presents with chief complaint of nausea, vomiting, diarrhea, abdominal pain for 5 weeks.  Labs demonstrate mild leukocytosis 11.26, normal hemoglobin, normal platelets.  CMP demonstrates mild hypokalemia to 3.0, normal creatinine, normal LFTs, hypomagnesemia 1.2, hypohypophosphatemia, lipase is within normal limits.  CT scan of the abdomen pelvis demonstrates to 1.4 small fat-containing umbilical hernia, no other acute findings.  Patient unable to give stool sample in ED.  Patient was given IV fluids antiemetics, pain medications, and magnesium and phosphorus were repleted in the department.  On reevaluation patient appears well.  He has no intra-abdominal emergency found today, okay for discharge home at this time, recommend close follow-up with her PCP, and close follow-up with GI, will give referral.  Will also send Zofran to pharmacy.  Strict return precaution given for any worsening.  Patient verbalized understanding agrees plan this time.

## 2024-03-04 NOTE — ED STATDOCS - PROGRESS NOTE DETAILS
In summary this is a 78-year-old female who presents with chief complaint of nausea, vomiting, diarrhea, lower abdominal pain for 5 weeks.  Mildly tachycardic on arrival 102, afebrile, blood pressure 140/87.  Patient with lower abdominal tenderness on exam, however abdomen is soft, nondistended, no rebound, no guarding.  Will obtain labs, CT of the abdomen, will check GI PCR, will give IV fluids, Zofran, IV Tylenol, and will reevaluate patient. Librado Jang D.O.

## 2024-06-08 ENCOUNTER — EMERGENCY (EMERGENCY)
Facility: HOSPITAL | Age: 79
LOS: 0 days | Discharge: HOME | End: 2024-06-09
Attending: STUDENT IN AN ORGANIZED HEALTH CARE EDUCATION/TRAINING PROGRAM
Payer: MEDICARE

## 2024-06-08 VITALS
RESPIRATION RATE: 18 BRPM | SYSTOLIC BLOOD PRESSURE: 142 MMHG | TEMPERATURE: 98 F | WEIGHT: 196.65 LBS | OXYGEN SATURATION: 98 % | DIASTOLIC BLOOD PRESSURE: 81 MMHG | HEART RATE: 115 BPM

## 2024-06-08 DIAGNOSIS — R11.2 NAUSEA WITH VOMITING, UNSPECIFIED: ICD-10-CM

## 2024-06-08 DIAGNOSIS — L29.9 PRURITUS, UNSPECIFIED: ICD-10-CM

## 2024-06-08 DIAGNOSIS — R10.32 LEFT LOWER QUADRANT PAIN: ICD-10-CM

## 2024-06-08 DIAGNOSIS — I10 ESSENTIAL (PRIMARY) HYPERTENSION: ICD-10-CM

## 2024-06-08 DIAGNOSIS — E03.9 HYPOTHYROIDISM, UNSPECIFIED: ICD-10-CM

## 2024-06-08 DIAGNOSIS — R10.31 RIGHT LOWER QUADRANT PAIN: ICD-10-CM

## 2024-06-08 DIAGNOSIS — Z88.5 ALLERGY STATUS TO NARCOTIC AGENT: ICD-10-CM

## 2024-06-08 DIAGNOSIS — Z88.0 ALLERGY STATUS TO PENICILLIN: ICD-10-CM

## 2024-06-08 DIAGNOSIS — Z88.4 ALLERGY STATUS TO ANESTHETIC AGENT: ICD-10-CM

## 2024-06-08 DIAGNOSIS — Z98.890 OTHER SPECIFIED POSTPROCEDURAL STATES: Chronic | ICD-10-CM

## 2024-06-08 LAB
ALBUMIN SERPL ELPH-MCNC: 3.6 G/DL — SIGNIFICANT CHANGE UP (ref 3.3–5)
ALP SERPL-CCNC: 134 U/L — HIGH (ref 40–120)
ALT FLD-CCNC: 38 U/L — SIGNIFICANT CHANGE UP (ref 12–78)
ANION GAP SERPL CALC-SCNC: 9 MMOL/L — SIGNIFICANT CHANGE UP (ref 5–17)
AST SERPL-CCNC: 41 U/L — HIGH (ref 15–37)
BASOPHILS # BLD AUTO: 0 K/UL — SIGNIFICANT CHANGE UP (ref 0–0.2)
BASOPHILS NFR BLD AUTO: 0 % — SIGNIFICANT CHANGE UP (ref 0–2)
BILIRUB SERPL-MCNC: 1 MG/DL — SIGNIFICANT CHANGE UP (ref 0.2–1.2)
BUN SERPL-MCNC: 25 MG/DL — HIGH (ref 7–23)
CALCIUM SERPL-MCNC: 9.7 MG/DL — SIGNIFICANT CHANGE UP (ref 8.5–10.1)
CHLORIDE SERPL-SCNC: 98 MMOL/L — SIGNIFICANT CHANGE UP (ref 96–108)
CO2 SERPL-SCNC: 24 MMOL/L — SIGNIFICANT CHANGE UP (ref 22–31)
CREAT SERPL-MCNC: 1.35 MG/DL — HIGH (ref 0.5–1.3)
EGFR: 41 ML/MIN/1.73M2 — LOW
EOSINOPHIL # BLD AUTO: 0 K/UL — SIGNIFICANT CHANGE UP (ref 0–0.5)
EOSINOPHIL NFR BLD AUTO: 0 % — SIGNIFICANT CHANGE UP (ref 0–6)
GLUCOSE SERPL-MCNC: 170 MG/DL — HIGH (ref 70–99)
HCT VFR BLD CALC: 41.4 % — SIGNIFICANT CHANGE UP (ref 34.5–45)
HGB BLD-MCNC: 15.1 G/DL — SIGNIFICANT CHANGE UP (ref 11.5–15.5)
LACTATE SERPL-SCNC: 1.4 MMOL/L — SIGNIFICANT CHANGE UP (ref 0.7–2)
LIDOCAIN IGE QN: 43 U/L — SIGNIFICANT CHANGE UP (ref 13–75)
LYMPHOCYTES # BLD AUTO: 0.43 K/UL — LOW (ref 1–3.3)
LYMPHOCYTES # BLD AUTO: 7 % — LOW (ref 13–44)
MAGNESIUM SERPL-MCNC: 1.5 MG/DL — LOW (ref 1.6–2.6)
MANUAL SMEAR VERIFICATION: SIGNIFICANT CHANGE UP
MCHC RBC-ENTMCNC: 32.8 PG — SIGNIFICANT CHANGE UP (ref 27–34)
MCHC RBC-ENTMCNC: 36.5 GM/DL — HIGH (ref 32–36)
MCV RBC AUTO: 90 FL — SIGNIFICANT CHANGE UP (ref 80–100)
MONOCYTES # BLD AUTO: 0.36 K/UL — SIGNIFICANT CHANGE UP (ref 0–0.9)
MONOCYTES NFR BLD AUTO: 6 % — SIGNIFICANT CHANGE UP (ref 2–14)
NEUTROPHILS # BLD AUTO: 5.29 K/UL — SIGNIFICANT CHANGE UP (ref 1.8–7.4)
NEUTROPHILS NFR BLD AUTO: 82 % — HIGH (ref 43–77)
NEUTS BAND # BLD: 5 % — SIGNIFICANT CHANGE UP (ref 0–8)
NRBC # BLD: 0 /100 WBCS — SIGNIFICANT CHANGE UP (ref 0–0)
NRBC # BLD: SIGNIFICANT CHANGE UP /100 WBCS (ref 0–0)
PHOSPHATE SERPL-MCNC: 2.6 MG/DL — SIGNIFICANT CHANGE UP (ref 2.5–4.5)
PLAT MORPH BLD: NORMAL — SIGNIFICANT CHANGE UP
PLATELET # BLD AUTO: 137 K/UL — LOW (ref 150–400)
POTASSIUM SERPL-MCNC: 3.3 MMOL/L — LOW (ref 3.5–5.3)
POTASSIUM SERPL-SCNC: 3.3 MMOL/L — LOW (ref 3.5–5.3)
PROT SERPL-MCNC: 7.8 GM/DL — SIGNIFICANT CHANGE UP (ref 6–8.3)
RBC # BLD: 4.6 M/UL — SIGNIFICANT CHANGE UP (ref 3.8–5.2)
RBC # FLD: 13.2 % — SIGNIFICANT CHANGE UP (ref 10.3–14.5)
RBC BLD AUTO: SIGNIFICANT CHANGE UP
SODIUM SERPL-SCNC: 131 MMOL/L — LOW (ref 135–145)
WBC # BLD: 6.08 K/UL — SIGNIFICANT CHANGE UP (ref 3.8–10.5)
WBC # FLD AUTO: 6.08 K/UL — SIGNIFICANT CHANGE UP (ref 3.8–10.5)

## 2024-06-08 PROCEDURE — 96374 THER/PROPH/DIAG INJ IV PUSH: CPT | Mod: XU

## 2024-06-08 PROCEDURE — 93010 ELECTROCARDIOGRAM REPORT: CPT

## 2024-06-08 PROCEDURE — 99285 EMERGENCY DEPT VISIT HI MDM: CPT | Mod: FS

## 2024-06-08 PROCEDURE — 83605 ASSAY OF LACTIC ACID: CPT

## 2024-06-08 PROCEDURE — 96375 TX/PRO/DX INJ NEW DRUG ADDON: CPT

## 2024-06-08 PROCEDURE — 99285 EMERGENCY DEPT VISIT HI MDM: CPT | Mod: 25

## 2024-06-08 PROCEDURE — 83690 ASSAY OF LIPASE: CPT

## 2024-06-08 PROCEDURE — 93005 ELECTROCARDIOGRAM TRACING: CPT

## 2024-06-08 PROCEDURE — 74177 CT ABD & PELVIS W/CONTRAST: CPT | Mod: MC

## 2024-06-08 PROCEDURE — 85025 COMPLETE CBC W/AUTO DIFF WBC: CPT

## 2024-06-08 PROCEDURE — 83735 ASSAY OF MAGNESIUM: CPT

## 2024-06-08 PROCEDURE — 80053 COMPREHEN METABOLIC PANEL: CPT

## 2024-06-08 PROCEDURE — 84100 ASSAY OF PHOSPHORUS: CPT

## 2024-06-08 PROCEDURE — 36415 COLL VENOUS BLD VENIPUNCTURE: CPT

## 2024-06-08 PROCEDURE — 74177 CT ABD & PELVIS W/CONTRAST: CPT | Mod: 26,MC

## 2024-06-08 RX ORDER — KETOROLAC TROMETHAMINE 30 MG/ML
15 SYRINGE (ML) INJECTION ONCE
Refills: 0 | Status: DISCONTINUED | OUTPATIENT
Start: 2024-06-08 | End: 2024-06-08

## 2024-06-08 RX ORDER — MAGNESIUM SULFATE 500 MG/ML
1 VIAL (ML) INJECTION ONCE
Refills: 0 | Status: COMPLETED | OUTPATIENT
Start: 2024-06-08 | End: 2024-06-08

## 2024-06-08 RX ORDER — SODIUM CHLORIDE 9 MG/ML
1000 INJECTION INTRAMUSCULAR; INTRAVENOUS; SUBCUTANEOUS ONCE
Refills: 0 | Status: COMPLETED | OUTPATIENT
Start: 2024-06-08 | End: 2024-06-08

## 2024-06-08 RX ORDER — ONDANSETRON 8 MG/1
4 TABLET, FILM COATED ORAL ONCE
Refills: 0 | Status: COMPLETED | OUTPATIENT
Start: 2024-06-08 | End: 2024-06-08

## 2024-06-08 RX ORDER — NYSTATIN CREAM 100000 [USP'U]/G
1 CREAM TOPICAL ONCE
Refills: 0 | Status: COMPLETED | OUTPATIENT
Start: 2024-06-08 | End: 2024-06-08

## 2024-06-08 RX ORDER — POTASSIUM CHLORIDE 20 MEQ
40 PACKET (EA) ORAL ONCE
Refills: 0 | Status: COMPLETED | OUTPATIENT
Start: 2024-06-08 | End: 2024-06-08

## 2024-06-08 RX ORDER — ACETAMINOPHEN 500 MG
1000 TABLET ORAL ONCE
Refills: 0 | Status: COMPLETED | OUTPATIENT
Start: 2024-06-08 | End: 2024-06-08

## 2024-06-08 RX ADMIN — ONDANSETRON 4 MILLIGRAM(S): 8 TABLET, FILM COATED ORAL at 22:24

## 2024-06-08 RX ADMIN — SODIUM CHLORIDE 1000 MILLILITER(S): 9 INJECTION INTRAMUSCULAR; INTRAVENOUS; SUBCUTANEOUS at 21:55

## 2024-06-08 RX ADMIN — Medication 100 GRAM(S): at 22:47

## 2024-06-08 RX ADMIN — Medication 400 MILLIGRAM(S): at 21:55

## 2024-06-08 RX ADMIN — Medication 1 APPLICATION(S): at 22:51

## 2024-06-08 RX ADMIN — Medication 40 MILLIEQUIVALENT(S): at 22:49

## 2024-06-08 RX ADMIN — SODIUM CHLORIDE 1000 MILLILITER(S): 9 INJECTION INTRAMUSCULAR; INTRAVENOUS; SUBCUTANEOUS at 22:50

## 2024-06-08 RX ADMIN — ONDANSETRON 4 MILLIGRAM(S): 8 TABLET, FILM COATED ORAL at 21:55

## 2024-06-08 RX ADMIN — NYSTATIN CREAM 1 APPLICATION(S): 100000 CREAM TOPICAL at 23:48

## 2024-06-08 NOTE — ED STATDOCS - OBJECTIVE STATEMENT
79 y/o female with PMHx of HTN, hypothyroid, and no past abdominal surgeries presents to ED c/o 7 days nausea progressively worse, vomiting with several episodes a day for the past 5 days. no diarrhea. no blood in vomit or stool. no chest pain or SOB. no urinary symptoms.

## 2024-06-08 NOTE — ED STATDOCS - PATIENT PORTAL LINK FT
You can access the FollowMyHealth Patient Portal offered by [facility] by registering at the following website: [email or web address]. By joining [employer]’s FollowMyHealth portal, you will also be able to view your health information using other applications (apps) compatible with our system.

## 2024-06-08 NOTE — ED STATDOCS - CLINICAL SUMMARY MEDICAL DECISION MAKING FREE TEXT BOX
ddx includes, but is not limited to the following: diverticulitis, gastroenteritis, colitis, gastritis, pancreatitis. lower suspicion for gu etiology.     plan: abdominal labs, CT abdominal pelvis, check electrolytes, check urine, anti medic, analgesia, reassess.

## 2024-06-08 NOTE — ED STATDOCS - PROGRESS NOTE DETAILS
77 y/o female with PMHx of HTN, hypothyroid, and no past abdominal surgeries presents to ED c/o 7 days nausea progressively worse, vomiting with several episodes a day for the past 5 days. no diarrhea. no blood in vomit or stool. no chest pain or SOB. no urinary symptoms.   Nicolle Salazar PA-C Pt. was evaluated in ED in March for same complaints and was DC after unremarkable CT.  Pt. able to eat very little because she has "no appetitie".  Tolerating PO fluids. Pt. was evaluated in ED in March for same complaints and was DC after unremarkable CT.  Pt. able to eat very little because she has "no appetitie".  Tolerating PO fluids.  Pt. did follow with Dr. Chowdary GI and had no remarkable findings on all testing.  Nicolle Salazar PA-C Labs, CT discussed by attending with patient.  Will DC with Zofran, follow with Dr. Chowdary.  Return for any concerns.  Nicolle Salazar PA-C Xiomara Montana MD, Attending  Pt additionally complaining of itchy skin under her left breast. Area examined, no erythema no vesicles. Likely dermatitis/ fungal. Antifungals ordered.

## 2024-06-08 NOTE — ED STATDOCS - CARE PROVIDER_API CALL
Fernando Chowdary  Gastroenterology  [facility], [address] [address]  Phone: [phone]  Fax: [phone]  Follow Up Time:

## 2024-06-08 NOTE — ED ADULT NURSE NOTE - NSFALLRISKINTERV_ED_ALL_ED

## 2024-06-08 NOTE — ED ADULT TRIAGE NOTE - CHIEF COMPLAINT QUOTE
Pt ambulatory to ED with c/o feeling sick/ abdominal pain and weakness x Thursday. Pt endorses nausea and vomiting. allergies to gabapentin and codeine. no meds PTA.

## 2024-06-08 NOTE — ED STATDOCS - PHYSICAL EXAMINATION
Rubén FORTE:  GEN: Patient awake alert NAD.   HEENT: normocephalic, atraumatic, EOMI, no scleral icterus,   CARDIAC: RRR, S1, S2, no murmur.   PULM: CTA B/L no wheeze, rhonchi, rales.   ABD: soft NT, ND, + bilateral lower quadrant TTP.   MSK: Moving all extremities, no edema. 5/5 strength and full ROM in all extremities.     NEURO: A&Ox3, gait normal, no focal neurological deficits, CN 2-12 grossly intact  SKIN: warm, dry, no rash.

## 2024-06-08 NOTE — ED STATDOCS - ATTENDING APP SHARED VISIT CONTRIBUTION OF CARE
I, Xiomara Montana MD,  I personally saw the pateint and performed a substantive portion of the visit including the following: initial face to face bedside interview with this patient regarding history of present illness, review of symptoms and relevant past medical, social and family history, independent physical examination, and medical decision making. I was the initial provider who evaluated this patient. I have discussed I have signed out the follow up of any pending tests (i.e. labs, radiological studies) to the PA/NP. I have communicated the patient’s plan of care and disposition with the PA/NP.  The history, relevant review of systems, past medical and surgical history, medical decision making, and physical examination was documented by the scribe in my presence and I attest to the accuracy of the documentation.

## 2024-06-09 VITALS
OXYGEN SATURATION: 100 % | RESPIRATION RATE: 18 BRPM | DIASTOLIC BLOOD PRESSURE: 78 MMHG | TEMPERATURE: 98 F | SYSTOLIC BLOOD PRESSURE: 134 MMHG | HEART RATE: 88 BPM

## 2024-06-09 RX ORDER — ONDANSETRON 8 MG/1
1 TABLET, FILM COATED ORAL
Qty: 15 | Refills: 0
Start: 2024-06-09 | End: 2024-06-13

## 2024-06-09 RX ADMIN — Medication 15 MILLIGRAM(S): at 00:02

## 2024-06-18 ENCOUNTER — EMERGENCY (EMERGENCY)
Facility: HOSPITAL | Age: 79
LOS: 0 days | Discharge: HOME | End: 2024-06-18
Attending: STUDENT IN AN ORGANIZED HEALTH CARE EDUCATION/TRAINING PROGRAM
Payer: MEDICARE

## 2024-06-18 VITALS
RESPIRATION RATE: 18 BRPM | OXYGEN SATURATION: 100 % | TEMPERATURE: 98 F | SYSTOLIC BLOOD PRESSURE: 132 MMHG | HEART RATE: 98 BPM | DIASTOLIC BLOOD PRESSURE: 79 MMHG

## 2024-06-18 VITALS
RESPIRATION RATE: 18 BRPM | DIASTOLIC BLOOD PRESSURE: 61 MMHG | SYSTOLIC BLOOD PRESSURE: 130 MMHG | TEMPERATURE: 99 F | OXYGEN SATURATION: 97 % | HEART RATE: 111 BPM

## 2024-06-18 DIAGNOSIS — R53.1 WEAKNESS: ICD-10-CM

## 2024-06-18 DIAGNOSIS — K59.00 CONSTIPATION, UNSPECIFIED: ICD-10-CM

## 2024-06-18 DIAGNOSIS — Z98.890 OTHER SPECIFIED POSTPROCEDURAL STATES: Chronic | ICD-10-CM

## 2024-06-18 DIAGNOSIS — Z88.4 ALLERGY STATUS TO ANESTHETIC AGENT: ICD-10-CM

## 2024-06-18 DIAGNOSIS — Z88.0 ALLERGY STATUS TO PENICILLIN: ICD-10-CM

## 2024-06-18 DIAGNOSIS — R25.1 TREMOR, UNSPECIFIED: ICD-10-CM

## 2024-06-18 DIAGNOSIS — R11.2 NAUSEA WITH VOMITING, UNSPECIFIED: ICD-10-CM

## 2024-06-18 DIAGNOSIS — I10 ESSENTIAL (PRIMARY) HYPERTENSION: ICD-10-CM

## 2024-06-18 DIAGNOSIS — E03.9 HYPOTHYROIDISM, UNSPECIFIED: ICD-10-CM

## 2024-06-18 DIAGNOSIS — Z88.5 ALLERGY STATUS TO NARCOTIC AGENT: ICD-10-CM

## 2024-06-18 DIAGNOSIS — R53.83 OTHER FATIGUE: ICD-10-CM

## 2024-06-18 DIAGNOSIS — Z20.822 CONTACT WITH AND (SUSPECTED) EXPOSURE TO COVID-19: ICD-10-CM

## 2024-06-18 DIAGNOSIS — E87.6 HYPOKALEMIA: ICD-10-CM

## 2024-06-18 DIAGNOSIS — E83.42 HYPOMAGNESEMIA: ICD-10-CM

## 2024-06-18 DIAGNOSIS — R41.3 OTHER AMNESIA: ICD-10-CM

## 2024-06-18 LAB
ALBUMIN SERPL ELPH-MCNC: 3.7 G/DL — SIGNIFICANT CHANGE UP (ref 3.3–5)
ALP SERPL-CCNC: 155 U/L — HIGH (ref 40–120)
ALT FLD-CCNC: 52 U/L — SIGNIFICANT CHANGE UP (ref 12–78)
ANION GAP SERPL CALC-SCNC: 9 MMOL/L — SIGNIFICANT CHANGE UP (ref 5–17)
APPEARANCE UR: CLEAR — SIGNIFICANT CHANGE UP
APTT BLD: 32.4 SEC — SIGNIFICANT CHANGE UP (ref 24.5–35.6)
AST SERPL-CCNC: 31 U/L — SIGNIFICANT CHANGE UP (ref 15–37)
BACTERIA # UR AUTO: NEGATIVE /HPF — SIGNIFICANT CHANGE UP
BASOPHILS # BLD AUTO: 0 K/UL — SIGNIFICANT CHANGE UP (ref 0–0.2)
BASOPHILS NFR BLD AUTO: 0 % — SIGNIFICANT CHANGE UP (ref 0–2)
BILIRUB SERPL-MCNC: 0.7 MG/DL — SIGNIFICANT CHANGE UP (ref 0.2–1.2)
BILIRUB UR-MCNC: NEGATIVE — SIGNIFICANT CHANGE UP
BUN SERPL-MCNC: 24 MG/DL — HIGH (ref 7–23)
CALCIUM SERPL-MCNC: 10.3 MG/DL — HIGH (ref 8.5–10.1)
CAST: 3 /LPF — SIGNIFICANT CHANGE UP (ref 0–4)
CHLORIDE SERPL-SCNC: 101 MMOL/L — SIGNIFICANT CHANGE UP (ref 96–108)
CO2 SERPL-SCNC: 23 MMOL/L — SIGNIFICANT CHANGE UP (ref 22–31)
COLOR SPEC: YELLOW — SIGNIFICANT CHANGE UP
CREAT SERPL-MCNC: 1.14 MG/DL — SIGNIFICANT CHANGE UP (ref 0.5–1.3)
DIFF PNL FLD: NEGATIVE — SIGNIFICANT CHANGE UP
EGFR: 50 ML/MIN/1.73M2 — LOW
EOSINOPHIL # BLD AUTO: 0.1 K/UL — SIGNIFICANT CHANGE UP (ref 0–0.5)
EOSINOPHIL NFR BLD AUTO: 1 % — SIGNIFICANT CHANGE UP (ref 0–6)
FLUAV AG NPH QL: SIGNIFICANT CHANGE UP
FLUBV AG NPH QL: SIGNIFICANT CHANGE UP
GLUCOSE SERPL-MCNC: 111 MG/DL — HIGH (ref 70–99)
GLUCOSE UR QL: NEGATIVE MG/DL — SIGNIFICANT CHANGE UP
HCT VFR BLD CALC: 36.3 % — SIGNIFICANT CHANGE UP (ref 34.5–45)
HGB BLD-MCNC: 13.2 G/DL — SIGNIFICANT CHANGE UP (ref 11.5–15.5)
INR BLD: 0.98 RATIO — SIGNIFICANT CHANGE UP (ref 0.85–1.18)
KETONES UR-MCNC: ABNORMAL MG/DL
LEUKOCYTE ESTERASE UR-ACNC: ABNORMAL
LIDOCAIN IGE QN: 72 U/L — SIGNIFICANT CHANGE UP (ref 13–75)
LYMPHOCYTES # BLD AUTO: 2.39 K/UL — SIGNIFICANT CHANGE UP (ref 1–3.3)
LYMPHOCYTES # BLD AUTO: 25 % — SIGNIFICANT CHANGE UP (ref 13–44)
MAGNESIUM SERPL-MCNC: 1.3 MG/DL — LOW (ref 1.6–2.6)
MANUAL SMEAR VERIFICATION: SIGNIFICANT CHANGE UP
MCHC RBC-ENTMCNC: 32.4 PG — SIGNIFICANT CHANGE UP (ref 27–34)
MCHC RBC-ENTMCNC: 36.4 GM/DL — HIGH (ref 32–36)
MCV RBC AUTO: 89 FL — SIGNIFICANT CHANGE UP (ref 80–100)
MONOCYTES # BLD AUTO: 0.86 K/UL — SIGNIFICANT CHANGE UP (ref 0–0.9)
MONOCYTES NFR BLD AUTO: 9 % — SIGNIFICANT CHANGE UP (ref 2–14)
NEUTROPHILS # BLD AUTO: 5.91 K/UL — SIGNIFICANT CHANGE UP (ref 1.8–7.4)
NEUTROPHILS NFR BLD AUTO: 62 % — SIGNIFICANT CHANGE UP (ref 43–77)
NITRITE UR-MCNC: NEGATIVE — SIGNIFICANT CHANGE UP
NRBC # BLD: 0 /100 WBCS — SIGNIFICANT CHANGE UP (ref 0–0)
NRBC # BLD: SIGNIFICANT CHANGE UP /100 WBCS (ref 0–0)
OVALOCYTES BLD QL SMEAR: SLIGHT — SIGNIFICANT CHANGE UP
PH UR: 7 — SIGNIFICANT CHANGE UP (ref 5–8)
PLAT MORPH BLD: NORMAL — SIGNIFICANT CHANGE UP
PLATELET # BLD AUTO: 396 K/UL — SIGNIFICANT CHANGE UP (ref 150–400)
POIKILOCYTOSIS BLD QL AUTO: SLIGHT — SIGNIFICANT CHANGE UP
POTASSIUM SERPL-MCNC: 3.4 MMOL/L — LOW (ref 3.5–5.3)
POTASSIUM SERPL-SCNC: 3.4 MMOL/L — LOW (ref 3.5–5.3)
PROMYELOCYTES # FLD: 1 % — HIGH (ref 0–0)
PROT SERPL-MCNC: 7.9 GM/DL — SIGNIFICANT CHANGE UP (ref 6–8.3)
PROT UR-MCNC: 30 MG/DL
PROTHROM AB SERPL-ACNC: 11.1 SEC — SIGNIFICANT CHANGE UP (ref 9.5–13)
RBC # BLD: 4.08 M/UL — SIGNIFICANT CHANGE UP (ref 3.8–5.2)
RBC # FLD: 12.7 % — SIGNIFICANT CHANGE UP (ref 10.3–14.5)
RBC BLD AUTO: ABNORMAL
RBC CASTS # UR COMP ASSIST: 2 /HPF — SIGNIFICANT CHANGE UP (ref 0–4)
RSV RNA NPH QL NAA+NON-PROBE: SIGNIFICANT CHANGE UP
S PYO AG SPEC QL IA: NEGATIVE — SIGNIFICANT CHANGE UP
SARS-COV-2 RNA SPEC QL NAA+PROBE: SIGNIFICANT CHANGE UP
SODIUM SERPL-SCNC: 133 MMOL/L — LOW (ref 135–145)
SP GR SPEC: 1.02 — SIGNIFICANT CHANGE UP (ref 1–1.03)
SQUAMOUS # UR AUTO: 2 /HPF — SIGNIFICANT CHANGE UP (ref 0–5)
TROPONIN I, HIGH SENSITIVITY RESULT: 12.82 NG/L — SIGNIFICANT CHANGE UP
TSH SERPL-MCNC: 4.41 UU/ML — SIGNIFICANT CHANGE UP (ref 0.34–4.82)
UROBILINOGEN FLD QL: 1 MG/DL — SIGNIFICANT CHANGE UP (ref 0.2–1)
VARIANT LYMPHS # BLD: 2 % — SIGNIFICANT CHANGE UP (ref 0–6)
WBC # BLD: 9.54 K/UL — SIGNIFICANT CHANGE UP (ref 3.8–10.5)
WBC # FLD AUTO: 9.54 K/UL — SIGNIFICANT CHANGE UP (ref 3.8–10.5)
WBC UR QL: 2 /HPF — SIGNIFICANT CHANGE UP (ref 0–5)

## 2024-06-18 PROCEDURE — 85025 COMPLETE CBC W/AUTO DIFF WBC: CPT

## 2024-06-18 PROCEDURE — 70450 CT HEAD/BRAIN W/O DYE: CPT | Mod: MC

## 2024-06-18 PROCEDURE — 85730 THROMBOPLASTIN TIME PARTIAL: CPT

## 2024-06-18 PROCEDURE — 70450 CT HEAD/BRAIN W/O DYE: CPT | Mod: 26,MC

## 2024-06-18 PROCEDURE — 84443 ASSAY THYROID STIM HORMONE: CPT

## 2024-06-18 PROCEDURE — 36415 COLL VENOUS BLD VENIPUNCTURE: CPT

## 2024-06-18 PROCEDURE — 99284 EMERGENCY DEPT VISIT MOD MDM: CPT

## 2024-06-18 PROCEDURE — 87880 STREP A ASSAY W/OPTIC: CPT

## 2024-06-18 PROCEDURE — 96374 THER/PROPH/DIAG INJ IV PUSH: CPT | Mod: XU

## 2024-06-18 PROCEDURE — 87081 CULTURE SCREEN ONLY: CPT | Mod: 59

## 2024-06-18 PROCEDURE — 99284 EMERGENCY DEPT VISIT MOD MDM: CPT | Mod: 25

## 2024-06-18 PROCEDURE — 80053 COMPREHEN METABOLIC PANEL: CPT

## 2024-06-18 PROCEDURE — 83735 ASSAY OF MAGNESIUM: CPT

## 2024-06-18 PROCEDURE — 85610 PROTHROMBIN TIME: CPT

## 2024-06-18 PROCEDURE — 0241U: CPT

## 2024-06-18 PROCEDURE — 81001 URINALYSIS AUTO W/SCOPE: CPT

## 2024-06-18 PROCEDURE — 84484 ASSAY OF TROPONIN QUANT: CPT

## 2024-06-18 PROCEDURE — 87086 URINE CULTURE/COLONY COUNT: CPT

## 2024-06-18 PROCEDURE — 83690 ASSAY OF LIPASE: CPT

## 2024-06-18 RX ORDER — ONDANSETRON 8 MG/1
4 TABLET, FILM COATED ORAL ONCE
Refills: 0 | Status: COMPLETED | OUTPATIENT
Start: 2024-06-18 | End: 2024-06-18

## 2024-06-18 RX ORDER — SODIUM CHLORIDE 9 MG/ML
1000 INJECTION INTRAMUSCULAR; INTRAVENOUS; SUBCUTANEOUS ONCE
Refills: 0 | Status: COMPLETED | OUTPATIENT
Start: 2024-06-18 | End: 2024-06-18

## 2024-06-18 RX ORDER — POTASSIUM CHLORIDE 20 MEQ
40 PACKET (EA) ORAL ONCE
Refills: 0 | Status: COMPLETED | OUTPATIENT
Start: 2024-06-18 | End: 2024-06-18

## 2024-06-18 RX ORDER — MAGNESIUM OXIDE 400 MG ORAL TABLET 241.3 MG
400 TABLET ORAL ONCE
Refills: 0 | Status: COMPLETED | OUTPATIENT
Start: 2024-06-18 | End: 2024-06-18

## 2024-06-18 RX ADMIN — Medication 40 MILLIEQUIVALENT(S): at 20:10

## 2024-06-18 RX ADMIN — SODIUM CHLORIDE 1000 MILLILITER(S): 9 INJECTION INTRAMUSCULAR; INTRAVENOUS; SUBCUTANEOUS at 20:24

## 2024-06-18 RX ADMIN — ONDANSETRON 4 MILLIGRAM(S): 8 TABLET, FILM COATED ORAL at 19:49

## 2024-06-18 RX ADMIN — SODIUM CHLORIDE 1000 MILLILITER(S): 9 INJECTION INTRAMUSCULAR; INTRAVENOUS; SUBCUTANEOUS at 20:10

## 2024-06-18 RX ADMIN — MAGNESIUM OXIDE 400 MG ORAL TABLET 400 MILLIGRAM(S): 241.3 TABLET ORAL at 20:10

## 2024-06-18 NOTE — ED ADULT NURSE NOTE - OBJECTIVE STATEMENT
pt presenting to the ed SIB PMD for aphasia secondary to new onset weakness. pt also endorsing multiple episodes of emesis, denies diarrhea or urinary issues. pt states she recently got DC'd from ED with zofran with relief until this morning. pt denies injuries/falls. denies blurry vision, headaches. befast-

## 2024-06-18 NOTE — ED PROVIDER NOTE - PATIENT PORTAL LINK FT
You can access the FollowMyHealth Patient Portal offered by [facility] by registering at the following website: [email or web address]. By joining [employer]’s FollowMyHealth portal, you will also be able to view your health information using other applications (apps) compatible with our system.

## 2024-06-18 NOTE — ED ADULT TRIAGE NOTE - CHIEF COMPLAINT QUOTE
Wheeled into triage c/o aphasia for about 5 days since she was d/c. Sent in by Dr Sigala. Might have had an intestinal bug last time, electrolytes were very low. patient has a hard time getting words out currently.

## 2024-06-18 NOTE — ED PROVIDER NOTE - OBJECTIVE STATEMENT
78 year old female with PMHx of HTN, hypothyroid; presents to ED sent in by PCP Dr Sigala c/o aphasia and memory issues. Per patient, when she went to PCP appointment today had difficulty answering questions, states PCP would ask her question she knew the answer to but had difficulty recalling. Endorsing generalized weakness, tremors, and also states when she wakes up in the morning she feels increasingly fatigued and cannot get out of bed. Per family, patient has been dealing with nausea, vomiting and constipation, denies any confusion. Patient denies vision changes. Recent followup with GI Dr Chowdary. Endorsing prior [facility] eval for similar complaint, patient states she gets better in ED but goes home and gets worse. Allergy to codeine, gabapentin, penicillin, propofol. 78 year old female with PMHx of HTN, hypothyroid; presents to ED sent in by PCP Dr Sigala c/o aphasia and memory issues. Per patient, when she went to PCP appointment today had difficulty answering questions, states PCP would ask her question she knew the answer to but had difficulty recalling. Endorsing generalized weakness, tremors, and also states when she wakes up in the morning she feels increasingly fatigued and cannot get out of bed. Per family, patient has been dealing with nausea, vomiting and constipation states she was diagnosed with a "systemic bug." Family denies any confusion. Patient denies vision changes. Endorsing prior HHED eval for similar complaint, patient states she gets better in ED but goes home and gets worse. Allergy to codeine, gabapentin, penicillin, propofol.    PCP Dr Jovon Chowdary

## 2024-06-18 NOTE — ED PROVIDER NOTE - CLINICAL SUMMARY MEDICAL DECISION MAKING FREE TEXT BOX
Patient with reported memory loss when trying to remember the names of her doctors with her PCP. Patient arrives well appearing, vitally stable, Neuro exam normal. As per family member at bedside patient is at baseline mental status. patient unsure why she couldn't remember the names however remembers her conversation with the PCP completely. States she feels gen weakness. Has been suffering from :systemic bug". Today labs showingmildly decreased mg and K. Repleted in ED and given IVF. CT with no acute findings. Patient feel much better with weakness. Ambulating independently in ED. Tolerating PO. Stable for dc. Patient understands return precautions and f/u.

## 2024-06-18 NOTE — ED ADULT NURSE REASSESSMENT NOTE - NS ED NURSE REASSESS COMMENT FT1
Received report from STEF Joyce RN. Pt resting in stretcher with safety maintained. Respirations even and unlabored, no distress noted at this time. Pt family at bedside.

## 2024-06-18 NOTE — ED ADULT NURSE NOTE - MODE OF DISCHARGE
Alert and oriented to person, place and time, memory intact, behavior appropriate to situation, PERRL. Wheelchair

## 2024-06-18 NOTE — ED PROVIDER NOTE - PHYSICAL EXAMINATION
Const: Well appearing, NAD  Eyes: PERRL, EOM intact  CV: RRR, no murmurs, no chest wall tenderness, distal pulses intact  Resp: CTAB, normal resp effort  GI: soft, nondistended, nontender  MSK: Full ROM, no muscle or bony deformity or tenderness  Neuro: AOx3, GCS 15, No focal deficits  Skin: No rash, laceration or abrasion  Psych: calm, cooperative.

## 2024-06-20 LAB
CULTURE RESULTS: SIGNIFICANT CHANGE UP
SPECIMEN SOURCE: SIGNIFICANT CHANGE UP

## 2024-07-04 ENCOUNTER — EMERGENCY (EMERGENCY)
Facility: HOSPITAL | Age: 79
LOS: 0 days | Discharge: HOME | End: 2024-07-05
Attending: STUDENT IN AN ORGANIZED HEALTH CARE EDUCATION/TRAINING PROGRAM
Payer: MEDICARE

## 2024-07-04 VITALS
WEIGHT: 190.04 LBS | DIASTOLIC BLOOD PRESSURE: 97 MMHG | HEART RATE: 115 BPM | OXYGEN SATURATION: 100 % | HEIGHT: 65 IN | SYSTOLIC BLOOD PRESSURE: 136 MMHG | TEMPERATURE: 97 F | RESPIRATION RATE: 20 BRPM

## 2024-07-04 DIAGNOSIS — Z88.0 ALLERGY STATUS TO PENICILLIN: ICD-10-CM

## 2024-07-04 DIAGNOSIS — R11.10 VOMITING, UNSPECIFIED: ICD-10-CM

## 2024-07-04 DIAGNOSIS — M06.9 RHEUMATOID ARTHRITIS, UNSPECIFIED: ICD-10-CM

## 2024-07-04 DIAGNOSIS — E87.6 HYPOKALEMIA: ICD-10-CM

## 2024-07-04 DIAGNOSIS — I10 ESSENTIAL (PRIMARY) HYPERTENSION: ICD-10-CM

## 2024-07-04 DIAGNOSIS — Z88.5 ALLERGY STATUS TO NARCOTIC AGENT: ICD-10-CM

## 2024-07-04 DIAGNOSIS — Z98.890 OTHER SPECIFIED POSTPROCEDURAL STATES: Chronic | ICD-10-CM

## 2024-07-04 DIAGNOSIS — E83.42 HYPOMAGNESEMIA: ICD-10-CM

## 2024-07-04 DIAGNOSIS — G62.9 POLYNEUROPATHY, UNSPECIFIED: ICD-10-CM

## 2024-07-04 DIAGNOSIS — Z88.8 ALLERGY STATUS TO OTHER DRUGS, MEDICAMENTS AND BIOLOGICAL SUBSTANCES: ICD-10-CM

## 2024-07-04 DIAGNOSIS — E03.9 HYPOTHYROIDISM, UNSPECIFIED: ICD-10-CM

## 2024-07-04 DIAGNOSIS — Z88.4 ALLERGY STATUS TO ANESTHETIC AGENT: ICD-10-CM

## 2024-07-04 DIAGNOSIS — R00.0 TACHYCARDIA, UNSPECIFIED: ICD-10-CM

## 2024-07-04 DIAGNOSIS — R11.2 NAUSEA WITH VOMITING, UNSPECIFIED: ICD-10-CM

## 2024-07-04 DIAGNOSIS — M35.00 SJOGREN SYNDROME, UNSPECIFIED: ICD-10-CM

## 2024-07-04 LAB
ALBUMIN SERPL ELPH-MCNC: 3.4 G/DL — SIGNIFICANT CHANGE UP (ref 3.3–5)
ALP SERPL-CCNC: 125 U/L — HIGH (ref 40–120)
ALT FLD-CCNC: 26 U/L — SIGNIFICANT CHANGE UP (ref 12–78)
ANION GAP SERPL CALC-SCNC: 14 MMOL/L — SIGNIFICANT CHANGE UP (ref 5–17)
AST SERPL-CCNC: 29 U/L — SIGNIFICANT CHANGE UP (ref 15–37)
BASOPHILS # BLD AUTO: 0.04 K/UL — SIGNIFICANT CHANGE UP (ref 0–0.2)
BASOPHILS NFR BLD AUTO: 0.4 % — SIGNIFICANT CHANGE UP (ref 0–2)
BILIRUB SERPL-MCNC: 1.2 MG/DL — SIGNIFICANT CHANGE UP (ref 0.2–1.2)
BUN SERPL-MCNC: 17 MG/DL — SIGNIFICANT CHANGE UP (ref 7–23)
CALCIUM SERPL-MCNC: 9.8 MG/DL — SIGNIFICANT CHANGE UP (ref 8.5–10.1)
CHLORIDE SERPL-SCNC: 99 MMOL/L — SIGNIFICANT CHANGE UP (ref 96–108)
CO2 SERPL-SCNC: 17 MMOL/L — LOW (ref 22–31)
CREAT SERPL-MCNC: 1.19 MG/DL — SIGNIFICANT CHANGE UP (ref 0.5–1.3)
EGFR: 47 ML/MIN/1.73M2 — LOW
EOSINOPHIL # BLD AUTO: 0.01 K/UL — SIGNIFICANT CHANGE UP (ref 0–0.5)
EOSINOPHIL NFR BLD AUTO: 0.1 % — SIGNIFICANT CHANGE UP (ref 0–6)
GLUCOSE SERPL-MCNC: 209 MG/DL — HIGH (ref 70–99)
HCT VFR BLD CALC: 31.2 % — LOW (ref 34.5–45)
HGB BLD-MCNC: 11.5 G/DL — SIGNIFICANT CHANGE UP (ref 11.5–15.5)
IMM GRANULOCYTES NFR BLD AUTO: 1.1 % — HIGH (ref 0–0.9)
LIDOCAIN IGE QN: 34 U/L — SIGNIFICANT CHANGE UP (ref 13–75)
LYMPHOCYTES # BLD AUTO: 2.06 K/UL — SIGNIFICANT CHANGE UP (ref 1–3.3)
LYMPHOCYTES # BLD AUTO: 21.3 % — SIGNIFICANT CHANGE UP (ref 13–44)
MAGNESIUM SERPL-MCNC: 1.1 MG/DL — LOW (ref 1.6–2.6)
MCHC RBC-ENTMCNC: 32.1 PG — SIGNIFICANT CHANGE UP (ref 27–34)
MCHC RBC-ENTMCNC: 36.9 GM/DL — HIGH (ref 32–36)
MCV RBC AUTO: 87.2 FL — SIGNIFICANT CHANGE UP (ref 80–100)
MONOCYTES # BLD AUTO: 0.7 K/UL — SIGNIFICANT CHANGE UP (ref 0–0.9)
MONOCYTES NFR BLD AUTO: 7.2 % — SIGNIFICANT CHANGE UP (ref 2–14)
NEUTROPHILS # BLD AUTO: 6.74 K/UL — SIGNIFICANT CHANGE UP (ref 1.8–7.4)
NEUTROPHILS NFR BLD AUTO: 69.9 % — SIGNIFICANT CHANGE UP (ref 43–77)
PLATELET # BLD AUTO: 202 K/UL — SIGNIFICANT CHANGE UP (ref 150–400)
POTASSIUM SERPL-MCNC: 3.1 MMOL/L — LOW (ref 3.5–5.3)
POTASSIUM SERPL-SCNC: 3.1 MMOL/L — LOW (ref 3.5–5.3)
PROT SERPL-MCNC: 7.5 GM/DL — SIGNIFICANT CHANGE UP (ref 6–8.3)
RBC # BLD: 3.58 M/UL — LOW (ref 3.8–5.2)
RBC # FLD: 12.9 % — SIGNIFICANT CHANGE UP (ref 10.3–14.5)
SODIUM SERPL-SCNC: 130 MMOL/L — LOW (ref 135–145)
TROPONIN I, HIGH SENSITIVITY RESULT: 8.74 NG/L — SIGNIFICANT CHANGE UP
WBC # BLD: 9.66 K/UL — SIGNIFICANT CHANGE UP (ref 3.8–10.5)
WBC # FLD AUTO: 9.66 K/UL — SIGNIFICANT CHANGE UP (ref 3.8–10.5)

## 2024-07-04 PROCEDURE — 99284 EMERGENCY DEPT VISIT MOD MDM: CPT | Mod: 25

## 2024-07-04 PROCEDURE — 99285 EMERGENCY DEPT VISIT HI MDM: CPT

## 2024-07-04 PROCEDURE — 85025 COMPLETE CBC W/AUTO DIFF WBC: CPT

## 2024-07-04 PROCEDURE — 84484 ASSAY OF TROPONIN QUANT: CPT

## 2024-07-04 PROCEDURE — 96376 TX/PRO/DX INJ SAME DRUG ADON: CPT

## 2024-07-04 PROCEDURE — 93010 ELECTROCARDIOGRAM REPORT: CPT

## 2024-07-04 PROCEDURE — 83735 ASSAY OF MAGNESIUM: CPT

## 2024-07-04 PROCEDURE — 83690 ASSAY OF LIPASE: CPT

## 2024-07-04 PROCEDURE — 36415 COLL VENOUS BLD VENIPUNCTURE: CPT

## 2024-07-04 PROCEDURE — 96366 THER/PROPH/DIAG IV INF ADDON: CPT

## 2024-07-04 PROCEDURE — 96375 TX/PRO/DX INJ NEW DRUG ADDON: CPT

## 2024-07-04 PROCEDURE — 96365 THER/PROPH/DIAG IV INF INIT: CPT

## 2024-07-04 PROCEDURE — 93005 ELECTROCARDIOGRAM TRACING: CPT

## 2024-07-04 PROCEDURE — 80053 COMPREHEN METABOLIC PANEL: CPT

## 2024-07-04 RX ORDER — POTASSIUM CHLORIDE 600 MG/1
10 TABLET, FILM COATED, EXTENDED RELEASE ORAL
Refills: 0 | Status: COMPLETED | OUTPATIENT
Start: 2024-07-04 | End: 2024-07-05

## 2024-07-04 RX ORDER — MAGNESIUM SULFATE 100 %
2 POWDER (GRAM) MISCELLANEOUS ONCE
Refills: 0 | Status: COMPLETED | OUTPATIENT
Start: 2024-07-04 | End: 2024-07-04

## 2024-07-04 RX ORDER — ONDANSETRON HYDROCHLORIDE 2 MG/ML
4 INJECTION INTRAMUSCULAR; INTRAVENOUS ONCE
Refills: 0 | Status: COMPLETED | OUTPATIENT
Start: 2024-07-04 | End: 2024-07-04

## 2024-07-04 RX ORDER — SODIUM CHLORIDE 0.9 % (FLUSH) 0.9 %
1000 SYRINGE (ML) INJECTION ONCE
Refills: 0 | Status: COMPLETED | OUTPATIENT
Start: 2024-07-04 | End: 2024-07-04

## 2024-07-04 RX ADMIN — Medication 1000 MILLILITER(S): at 21:45

## 2024-07-04 RX ADMIN — ONDANSETRON HYDROCHLORIDE 4 MILLIGRAM(S): 2 INJECTION INTRAMUSCULAR; INTRAVENOUS at 21:45

## 2024-07-04 RX ADMIN — Medication 1000 MILLILITER(S): at 21:57

## 2024-07-04 RX ADMIN — POTASSIUM CHLORIDE 100 MILLIEQUIVALENT(S): 600 TABLET, FILM COATED, EXTENDED RELEASE ORAL at 23:55

## 2024-07-04 RX ADMIN — Medication 2 GRAM(S): at 23:57

## 2024-07-04 RX ADMIN — Medication 25 GRAM(S): at 21:57

## 2024-07-05 VITALS
HEART RATE: 95 BPM | DIASTOLIC BLOOD PRESSURE: 56 MMHG | TEMPERATURE: 98 F | RESPIRATION RATE: 16 BRPM | SYSTOLIC BLOOD PRESSURE: 113 MMHG | OXYGEN SATURATION: 100 %

## 2024-07-05 RX ORDER — DIPHENHYDRAMINE HCL 12.5MG/5ML
25 ELIXIR ORAL ONCE
Refills: 0 | Status: COMPLETED | OUTPATIENT
Start: 2024-07-05 | End: 2024-07-05

## 2024-07-05 RX ORDER — SODIUM CHLORIDE 0.9 % (FLUSH) 0.9 %
1000 SYRINGE (ML) INJECTION ONCE
Refills: 0 | Status: COMPLETED | OUTPATIENT
Start: 2024-07-05 | End: 2024-07-05

## 2024-07-05 RX ORDER — KETOROLAC TROMETHAMINE 30 MG/ML
30 INJECTION, SOLUTION INTRAMUSCULAR ONCE
Refills: 0 | Status: DISCONTINUED | OUTPATIENT
Start: 2024-07-05 | End: 2024-07-05

## 2024-07-05 RX ORDER — METOCLOPRAMIDE 5 MG/5ML
10 SOLUTION ORAL ONCE
Refills: 0 | Status: COMPLETED | OUTPATIENT
Start: 2024-07-05 | End: 2024-07-05

## 2024-07-05 RX ORDER — ONDANSETRON HYDROCHLORIDE 2 MG/ML
1 INJECTION INTRAMUSCULAR; INTRAVENOUS
Qty: 28 | Refills: 0
Start: 2024-07-05 | End: 2024-07-11

## 2024-07-05 RX ADMIN — KETOROLAC TROMETHAMINE 30 MILLIGRAM(S): 30 INJECTION, SOLUTION INTRAMUSCULAR at 03:34

## 2024-07-05 RX ADMIN — ONDANSETRON HYDROCHLORIDE 4 MILLIGRAM(S): 2 INJECTION INTRAMUSCULAR; INTRAVENOUS at 00:12

## 2024-07-05 RX ADMIN — Medication 25 MILLIGRAM(S): at 03:34

## 2024-07-05 RX ADMIN — POTASSIUM CHLORIDE 10 MILLIEQUIVALENT(S): 600 TABLET, FILM COATED, EXTENDED RELEASE ORAL at 00:52

## 2024-07-05 RX ADMIN — Medication 1000 MILLILITER(S): at 01:30

## 2024-07-05 RX ADMIN — POTASSIUM CHLORIDE 100 MILLIEQUIVALENT(S): 600 TABLET, FILM COATED, EXTENDED RELEASE ORAL at 00:52

## 2024-07-05 RX ADMIN — POTASSIUM CHLORIDE 100 MILLIEQUIVALENT(S): 600 TABLET, FILM COATED, EXTENDED RELEASE ORAL at 02:03

## 2024-07-05 RX ADMIN — METOCLOPRAMIDE 10 MILLIGRAM(S): 5 SOLUTION ORAL at 03:34

## 2024-07-29 PROBLEM — E06.3 HYPOTHYROIDISM DUE TO HASHIMOTO'S THYROIDITIS: Status: ACTIVE | Noted: 2017-09-24

## 2024-09-24 ENCOUNTER — EMERGENCY (EMERGENCY)
Facility: HOSPITAL | Age: 79
LOS: 0 days | Discharge: HOME | End: 2024-09-24
Attending: EMERGENCY MEDICINE
Payer: MEDICARE

## 2024-09-24 VITALS
SYSTOLIC BLOOD PRESSURE: 112 MMHG | HEART RATE: 103 BPM | OXYGEN SATURATION: 97 % | DIASTOLIC BLOOD PRESSURE: 61 MMHG | TEMPERATURE: 98 F | RESPIRATION RATE: 19 BRPM

## 2024-09-24 DIAGNOSIS — M25.571 PAIN IN RIGHT ANKLE AND JOINTS OF RIGHT FOOT: ICD-10-CM

## 2024-09-24 DIAGNOSIS — G62.9 POLYNEUROPATHY, UNSPECIFIED: ICD-10-CM
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
## 2024-09-24 DIAGNOSIS — Z88.0 ALLERGY STATUS TO PENICILLIN: ICD-10-CM

## 2024-09-24 DIAGNOSIS — Z88.4 ALLERGY STATUS TO ANESTHETIC AGENT: ICD-10-CM

## 2024-09-24 DIAGNOSIS — S80.211A ABRASION, RIGHT KNEE, INITIAL ENCOUNTER: ICD-10-CM

## 2024-09-24 DIAGNOSIS — M35.00 SJOGREN SYNDROME, UNSPECIFIED: ICD-10-CM

## 2024-09-24 DIAGNOSIS — I10 ESSENTIAL (PRIMARY) HYPERTENSION: ICD-10-CM

## 2024-09-24 DIAGNOSIS — Z98.890 OTHER SPECIFIED POSTPROCEDURAL STATES: Chronic | ICD-10-CM
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
## 2024-09-24 DIAGNOSIS — Z88.5 ALLERGY STATUS TO NARCOTIC AGENT: ICD-10-CM
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
## 2024-09-24 DIAGNOSIS — M06.9 RHEUMATOID ARTHRITIS, UNSPECIFIED: ICD-10-CM

## 2024-09-24 DIAGNOSIS — E03.9 HYPOTHYROIDISM, UNSPECIFIED: ICD-10-CM

## 2024-09-24 PROCEDURE — 99284 EMERGENCY DEPT VISIT MOD MDM: CPT | Mod: 25

## 2024-09-24 PROCEDURE — 73630 X-RAY EXAM OF FOOT: CPT | Mod: 26,RT

## 2024-09-24 PROCEDURE — 99284 EMERGENCY DEPT VISIT MOD MDM: CPT

## 2024-09-24 PROCEDURE — 73610 X-RAY EXAM OF ANKLE: CPT | Mod: RT

## 2024-09-24 PROCEDURE — 73630 X-RAY EXAM OF FOOT: CPT | Mod: RT

## 2024-09-24 PROCEDURE — 73610 X-RAY EXAM OF ANKLE: CPT | Mod: 26,RT

## 2024-09-24 RX ORDER — OXYCODONE AND ACETAMINOPHEN 7.5; 325 MG/1; MG/1
1 TABLET ORAL
Qty: 12 | Refills: 0
Start: 2024-09-24 | End: 2024-09-27

## 2024-09-24 RX ORDER — IBUPROFEN 600 MG
600 TABLET ORAL ONCE
Refills: 0 | Status: COMPLETED | OUTPATIENT
Start: 2024-09-24 | End: 2024-09-24

## 2024-09-24 RX ORDER — ONDANSETRON 2 MG/ML
4 INJECTION, SOLUTION INTRAMUSCULAR; INTRAVENOUS ONCE
Refills: 0 | Status: COMPLETED | OUTPATIENT
Start: 2024-09-24 | End: 2024-09-24

## 2024-09-24 RX ORDER — ONDANSETRON 2 MG/ML
1 INJECTION, SOLUTION INTRAMUSCULAR; INTRAVENOUS
Qty: 2 | Refills: 0
Start: 2024-09-24 | End: 2024-09-30

## 2024-09-24 RX ADMIN — ONDANSETRON 4 MILLIGRAM(S): 2 INJECTION, SOLUTION INTRAMUSCULAR; INTRAVENOUS at 17:36

## 2024-09-24 RX ADMIN — Medication 600 MILLIGRAM(S): at 17:35

## 2024-09-24 NOTE — ED STATDOCS - OBJECTIVE STATEMENT
78 y/o F with PMHx of sicca syndrome, rheumatoid disease, neuropathy, hypothyroid, Lyme disease, HTN presents to the ED c/o R ankle pain s/p rolling her ankle. States she was walking when she lifted something and rolled her ankle. Has been ambulating since then. Pt broke her ankle a year ago and had surgery.

## 2024-09-24 NOTE — ED STATDOCS - ATTENDING CONTRIBUTION TO CARE
I,Jose Salinas MD,  performed the initial face to face bedside interview with this patient regarding history of present illness, review of symptoms and relevant past medical, social and family history.  I completed an independent physical examination.  I was the initial provider who evaluated this patient. I have signed out the follow up of any pending tests (i.e. labs, radiological studies) to the resident I have communicated the patient’s plan of care and disposition with the resident   The history, relevant review of systems, past medical and surgical history, medical decision making, and physical examination was documented by the scribe in my presence and I attest to the accuracy of the documentation.

## 2024-09-24 NOTE — ED STATDOCS - NSFOLLOWUPINSTRUCTIONS_ED_ALL_ED_FT
-Please follow-up with your primary care doctor in the next 2 days.  Please call tomorrow for an appointment.  If you cannot follow-up with your primary care doctor please return to the ED for any urgent issues.  - You were given a copy of the tests performed today.  Please bring the results with you and review them with your primary care doctor.  - If you have any worsening of symptoms or any other concerns please return to the ED immediately.  - Please continue taking your home medications as directed.    Sprain    A sprain is a stretch or tear in one of the tough, fiber-like tissues (ligaments) in your body. This is caused by an injury to the area such as a twisting mechanism. Symptoms include pain, swelling, or bruising. Rest that area over the next several days and slowly resume activity when tolerated. Ice can help with swelling and pain.     SEEK IMMEDIATE MEDICAL CARE IF YOU HAVE ANY OF THE FOLLOWING SYMPTOMS: worsening pain, inability to move that body part, numbness or tingling.

## 2024-09-24 NOTE — ED ADULT NURSE NOTE - CCCP TRG CHIEF CMPLNT
ankle pain/injury Was A Bandage Applied: Yes Curettage Text: The wound bed was treated with curettage after the biopsy was performed. Lab: [id number] Post-Care Instructions: I reviewed with the patient in detail post-care instructions. Patient is to keep the biopsy site dry overnight, and then apply bacitracin twice daily until healed. Patient may apply hydrogen peroxide soaks to remove any crusting. Notification Instructions: Patient will be notified of biopsy results. However, patient instructed to call the office if not contacted within 2 weeks. Silver Nitrate Text: The wound bed was treated with silver nitrate after the biopsy was performed. Biopsy Type: H and E Bill [id number] For Specimen Handling/Conveyance To Laboratory?: no Consent: Written consent was obtained and risks were reviewed including but not limited to scarring, infection, bleeding, scabbing, incomplete removal, nerve damage and allergy to anesthesia. Anesthesia Type: 1% lidocaine with epinephrine Lab Facility: [id number] Additional Anesthesia Volume In Cc (Will Not Render If 0): 0 Billing Type: Third-Party Bill Electrodesiccation And Curettage Text: The wound bed was treated with electrodesiccation and curettage after the biopsy was performed. Cryotherapy Text: The wound bed was treated with cryotherapy after the biopsy was performed. Detail Level: Detailed Hemostasis: Drysol Depth Of Biopsy: dermis Electrodesiccation Text: The wound bed was treated with electrodesiccation after the biopsy was performed. Type Of Destruction Used: Curettage Dressing: bandage Wound Care: Petrolatum Anesthesia Volume In Cc: 0.5 Size Of Lesion In Cm: 0.3 Biopsy Method: Dermablade

## 2024-09-24 NOTE — ED STATDOCS - MUSCULOSKELETAL, MLM
range of motion is not limited and R knee with abrasion, no bony tenderness, R lateral malleolus tenderness, NVI

## 2024-09-24 NOTE — ED STATDOCS - PATIENT PORTAL LINK FT
You can access the FollowMyHealth Patient Portal offered by [facility] by registering at the following website: [email or web address]. By joining [employer]’s FollowMyHealth portal, you will also be able to view your health information using other applications (apps) compatible with our system.

## 2024-09-26 ENCOUNTER — APPOINTMENT (OUTPATIENT)
Dept: ORTHOPEDIC SURGERY | Facility: CLINIC | Age: 79
End: 2024-09-26
Payer: MEDICARE

## 2024-09-26 ENCOUNTER — APPOINTMENT (OUTPATIENT)
Dept: MRI IMAGING | Facility: HOSPITAL | Age: 79
End: 2024-09-26
Payer: MEDICARE

## 2024-09-26 ENCOUNTER — OUTPATIENT (OUTPATIENT)
Dept: OUTPATIENT SERVICES | Facility: HOSPITAL | Age: 79
LOS: 1 days | End: 2024-09-26
Payer: COMMERCIAL

## 2024-09-26 DIAGNOSIS — M25.572 PAIN IN LEFT ANKLE AND JOINTS OF LEFT FOOT: ICD-10-CM

## 2024-09-26 DIAGNOSIS — M76.822 POSTERIOR TIBIAL TENDINITIS, LEFT LEG: ICD-10-CM
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
## 2024-09-26 DIAGNOSIS — Z98.890 OTHER SPECIFIED POSTPROCEDURAL STATES: Chronic | ICD-10-CM

## 2024-09-26 DIAGNOSIS — S99.912A UNSPECIFIED INJURY OF LEFT ANKLE, INITIAL ENCOUNTER: ICD-10-CM

## 2024-09-26 PROCEDURE — 99213 OFFICE O/P EST LOW 20 MIN: CPT

## 2024-09-26 PROCEDURE — 73630 X-RAY EXAM OF FOOT: CPT | Mod: 26,LT

## 2024-09-26 PROCEDURE — 73610 X-RAY EXAM OF ANKLE: CPT | Mod: 26,LT

## 2024-09-26 PROCEDURE — 73721 MRI JNT OF LWR EXTRE W/O DYE: CPT | Mod: 26,LT,MH

## 2024-09-26 PROCEDURE — 73721 MRI JNT OF LWR EXTRE W/O DYE: CPT

## 2024-09-26 RX ORDER — ONDANSETRON 4 MG/1
4 TABLET ORAL
Qty: 20 | Refills: 0 | Status: ACTIVE | COMMUNITY
Start: 2024-09-26 | End: 1900-01-01

## 2024-09-26 NOTE — HISTORY OF PRESENT ILLNESS
[FreeTextEntry1] : The patient is a 79-year-old female who presents of left ankle pain.  The patient took a fall less than 1 week ago accidentally.  She presents with a painful and swollen left ankle.  She does have a ankle Aircast on her right ankle but she is most concerned about her left ankle.  Her pain scale left ankle 6 out of 10.  Patient currently on pain medications, given to her by the hospital.  Patient in a wheelchair, nonweightbearing because of the left ankle pain.  No other complaints.

## 2024-09-26 NOTE — PHYSICAL EXAM
[de-identified] : Left ankle Physical Examination:  General: Alert and oriented x3.  In no acute distress.  Pleasant in nature with a normal affect.  No apparent respiratory distress.  Erythema, Warmth, Rubor: Negative Swelling: Positive  ROM: Limited due to pain.  Tenderness to Palpation:  1. Lateral Malleolus: Negative 2. Medial Malleolus: Negative 3. Proximal Fibular Pain: Negative 4. Heel Pain: Negative 5. Cuboid: Negative 6. Navicular: Negative 7. Tibiotalar Joint: Positive pain when palpating the central ankle tibiotalar joint. 8. Subtalar Joint: Negative 9. Posterior Recess: Negative  Tendon Pain: 1. Achilles: Negative 2. Peroneals: Negative 3. Posterior Tibialis: Severe tenderness to palpation. 4. Tibialis Anterior: Negative  Ligament Pain: 1. ATFL: Positive/pain in the sinus tarsi. 2. CFL: Negative  3. PTFL: Negative 4. Deltoid Ligaments: Negative 5. Lis Franc Ligament: Negative  Stability:  1. Anterior Drawer: Negative 2. Posterior Drawer: Negative  Strength: 5/5 TA/GS/EHL  Pulses: 2+ DP/PT Pulses  Neuro: Intact motor and sensory  Additional Test: 1. Calcaneal Squeeze Test: Negative 2. Syndesmosis Squeeze Test: Negative    [de-identified] : 6 views x-rays left ankle and foot reviewed, 9/26/2024 in office: No fractures present on x-ray.  Pes planus noted.  Midfoot arthritic changes seen.   ACC: [id number] EXAM: XR FOOT COMP MIN 3 VIEWS RT ORDERED BY: DB OLIVER  ACC: [id number] EXAM: XR ANKLE COMP MIN 3 VIEWS RT ORDERED BY: DB OLIVER  PROCEDURE DATE: 09/24/2024    INTERPRETATION: Right ankle and right foot. Patient had local trauma.  On August 9, 2023 there is a fracture of the lower fibula subsequently repaired with hardware.  Right ankle. 3 views.  Hardware in the lower fibula with good alignment signs of healing noted.  There is slight degeneration of the malleolar tips. No acute fracture.  Left foot. 3 views.  There is tarsometatarsal degeneration most pronounced at the base of the first metatarsal. There is a mild hallux valgus with some degeneration.  No acute fracture.  IMPRESSION: Old right fibular fracture repair with good result. Degeneration at the tarsometatarsal junction. No acute fracture.  --- End of Report ---      JEANETTE KULKARNI MD; Attending Radiologist This document has been electronically signed. Sep 24 2024 6:17PM

## 2024-09-26 NOTE — DISCUSSION/SUMMARY
[de-identified] : Because of the pain around the ankle joint left ankle, I do want to proceed with an MRI of the left ankle without contrast to evaluate for an occult fracture of the ankle.  Patient was given a short cam walking boot and she should use her walker at home versus nonweightbearing depending on her balance and pain scale in both of her ankles.  She will continue to use the Aircast for her right ankle which is way less symptomatic than her left ankle.  Once the left ankle MRI is completed, the patient return to office to review with Dr. Barnes.  Continue with conservative management.  No invasive treatments warranted at this time.  Continue with RICE therapy for swelling control.  Activity modifications.  All questions answered.  She understood and agreed to the treatment course.

## 2024-09-27 ENCOUNTER — APPOINTMENT (OUTPATIENT)
Dept: ULTRASOUND IMAGING | Facility: CLINIC | Age: 79
End: 2024-09-27
Payer: MEDICARE

## 2024-09-27 ENCOUNTER — APPOINTMENT (OUTPATIENT)
Dept: ORTHOPEDIC SURGERY | Facility: CLINIC | Age: 79
End: 2024-09-27
Payer: MEDICARE

## 2024-09-27 ENCOUNTER — OUTPATIENT (OUTPATIENT)
Dept: OUTPATIENT SERVICES | Facility: HOSPITAL | Age: 79
LOS: 1 days | End: 2024-09-27
Payer: MEDICARE

## 2024-09-27 DIAGNOSIS — S92.002A UNSPECIFIED FRACTURE OF LEFT CALCANEUS, INITIAL ENCOUNTER FOR CLOSED FRACTURE: ICD-10-CM

## 2024-09-27 DIAGNOSIS — Z98.890 OTHER SPECIFIED POSTPROCEDURAL STATES: Chronic | ICD-10-CM

## 2024-09-27 PROCEDURE — 93970 EXTREMITY STUDY: CPT

## 2024-09-27 PROCEDURE — 99214 OFFICE O/P EST MOD 30 MIN: CPT

## 2024-09-27 PROCEDURE — 93970 EXTREMITY STUDY: CPT | Mod: 26

## 2024-09-27 RX ORDER — ERGOCALCIFEROL 1.25 MG/1
1.25 MG CAPSULE, LIQUID FILLED ORAL
Qty: 6 | Refills: 0 | Status: ACTIVE | COMMUNITY
Start: 2024-09-27 | End: 1900-01-01

## 2024-09-27 RX ORDER — MELOXICAM 15 MG/1
15 TABLET ORAL
Qty: 30 | Refills: 3 | Status: ACTIVE | COMMUNITY
Start: 2024-09-27 | End: 1900-01-01

## 2024-09-27 NOTE — HISTORY OF PRESENT ILLNESS
[FreeTextEntry1] : 09/27/2024: SELVIN JACK is a 79 year old female presenting to the office for a follow up evaluation and MRI review of her left ankle, nondisplaced incomplete fracture of the calcaneus body. She reports no improvement in her symptoms since last visit. She still notes swelling. Patient denies being admitted to the hospital when she was on vacation. She states that she then travelled 4 hours in her car when travelling back to [place]. She states that she did go to [facility] after her trip, but she states that she was not diagnosed with a blood clot. She reports difficulty with sleep. Today, she states that she feels dehydrated. The patient appears distressed. She was prescribed Percocet, Cyclobenzaprine, and Tramadol at the hospital. The patient presents to the office NWB wearing a medical stiff shoe and an OTC ankle brace and ambulating with a wheelchair.  She is in some distress with positioning of her right leg and left leg.   09/26/2024: The patient is a 79-year-old female who presents of left ankle pain.  The patient took a fall less than 1 week ago accidentally.  She presents with a painful and swollen left ankle.  She does have a ankle Aircast on her right ankle but she is most concerned about her left ankle.  Her pain scale left ankle 6 out of 10.  Patient currently on pain medications, given to her by the hospital.  Patient in a wheelchair, nonweightbearing because of the left ankle pain.  No other complaints.

## 2024-09-27 NOTE — ADDENDUM
[FreeTextEntry1] : I, Earle Carlos, acted solely as a scribe for Dr. Aj Barnes on this date 09/27/2024.   All medical record entries made by the Scribe were at my, Dr. Aj Barnes, direction and personally dictated by me on 09/27/2024. I have reviewed the chart and agree that the record accurately reflects my personal performance of the history, physical exam, assessment and plan. I have also personally directed, reviewed, and agreed with the chart.

## 2024-09-27 NOTE — DISCUSSION/SUMMARY
[de-identified] : Today I had a lengthy discussion with the patient regarding their left ankle pain. I have addressed all the patient's concerns surrounding the pathology of their condition. I have reviewed the patient's MRI results with them in great detail. MRI of the left ankle demonstrate bone marrow edema within the body of the calcaneus and adjacent talar body. There is no definitive fracture line through the calcaneus, however, findings are felt to represent nondisplaced incomplete fracture of the calcaneus body. Fracture care was discussed in great detail. I am also concerned that the patient has a blood clot. I would like the patient to proceed with conservative management, which was discussed in great detail.   Plan:  1. I recommend that the patient utilize ice, NSAIDS/Tylenol PRN, and heat. They can also elevate their LLE above the level of the heart. 2. I recommend that the patient utilize meloxicam with food once per day as instructed. A prescription for the meloxicam was ordered for the patient in the office today. 3. The patient reports discomfort with a CAM boot. The patient wants to utilize a new medical stiff shoe. The patient was provided with the stiff shoe in the office today. I recommend that the patient utilize a stiff shoe. The patient was provided with the stiff shoe in the office today. 4. I recommend that the patient receive a venous Doppler DVT test. The DVT test was ordered for the patient in the office today. 5. The patient to use her Percocet, Cyclobenzoprene, and Tramadol medications. 6. The patient appears nauseous and in distress. I advised the patient to check-in to a hospital and to call her PCP if her symptoms worsens.  f/u in 2 weeks.   I expressed my concern with her overall status.  I reviewed her pain medication regimen which resulted in some confusion surrounding ingredients that may be causing her an adverse effect.  She did mention that she has a codeine allergy and cannot tolerate any medication with those ingredients.  I reviewed her muscle relaxants as well as utilizing Ultracet which she mentioned as well.  I encouraged her to follow-up with Dr. Sigala who is her primary care physician to ensure there is no dehydration that requires further intervention.  I am recommending ice and elevation and she will return to the office in 2 weeks.  The patient understood and verbally agreed to the treatment plan. All of their questions were answered, and they were satisfied with the visit. The patient should call the office if they have any questions or experience worsening symptoms.

## 2024-09-27 NOTE — PHYSICAL EXAM
[de-identified] : Left ankle Physical Examination:  General: Alert and oriented x3.  In no acute distress.  Pleasant in nature with a normal affect.  No apparent respiratory distress.  Erythema, Warmth, Rubor: Negative Swelling: Positive  ROM: Limited due to pain.  Tenderness to Palpation:  1. Lateral Malleolus: Negative 2. Medial Malleolus: Negative 3. Proximal Fibular Pain: Negative 4. Heel Pain: Negative 5. Cuboid: Negative 6. Navicular: Negative 7. Tibiotalar Joint: Positive pain when palpating the central ankle tibiotalar joint. 8. Subtalar Joint: Negative 9. Posterior Recess: Negative  Tendon Pain: 1. Achilles: Negative 2. Peroneals: Negative 3. Posterior Tibialis: Severe tenderness to palpation. 4. Tibialis Anterior: Negative  Ligament Pain: 1. ATFL: Positive/pain in the sinus tarsi. 2. CFL: Negative  3. PTFL: Negative 4. Deltoid Ligaments: Negative 5. Lis Franc Ligament: Negative  Stability:  1. Anterior Drawer: Negative 2. Posterior Drawer: Negative  Strength: 5/5 TA/GS/EHL  Pulses: 2+ DP/PT Pulses  Neuro: Intact motor and sensory  Additional Test: 1. Calcaneal Squeeze Test: Negative 2. Syndesmosis Squeeze Test: Negative    [de-identified] : ACC: [id number]     EXAM:  MR ANKLE LT   ORDERED BY: MELINA MERCERALFREDA  PROCEDURE DATE:  09/26/2024    INTERPRETATION:  LEFT ANKLE MRI  CLINICAL INFORMATION: Pain status post fall. TECHNIQUE: Multiplanar, multisequence MRI was obtained of the left ankle.  FINDINGS:  Multiple sequences are degraded secondary to motion artifact despite sequence repetition, however, study remains diagnostic.  MUSCLES AND TENDONS: The Achilles tendon is intact. There is no tendon tear. There is tenosynovitis of the peroneal brevis, peroneal longus, flexor digitorum longus, and posterior tibialis tendons. LIGAMENTS: The anterior and posterior tibiofibular ligaments are intact. There is thickening and heterogeneous signal of the anterior and posterior talofibular ligaments the calcaneofibular ligament is not definitively identified. The deltoid ligament appears very mild. There is partial tearing of the spring ligament complex. CARTILAGE and SUBCHONDRAL BONE: There is osseous remodeling at the talar dome. There is loss of cartilage at the lateral talar dome. High-grade loss of cartilage is present at the medial tibial plafond. SYNOVIUM/JOINT FLUID: There is a moderate size joint effusion. MARROW: Bone marrow edema is present at the body of the calcaneus and along the anterior facet as well as at the adjacent talus body (series 6, images 11-18). There is no clear fracture line. There is a tear process/os trigonum with adjacent marrow edema and fluid. PLANTAR FASCIA: Normal. NEUROVASCULAR STRUCTURES: Normal. SINUS TARSI: Edematous changes within the sinus Tarsi. PERIPHERAL/ SUB-CUTANEOUS SOFT TISSUES: Soft tissue edema about the medial lateral ankle.  IMPRESSION: MRI of the left ankle demonstrate bone marrow edema within the body of the calcaneus and adjacent talar body. There is no definitive fracture line through the calcaneus, however, findings are felt to represent nondisplaced incomplete fracture of the calcaneus body.  Moderate size joint effusion.  Tenosynovitis of the posterior flexor and peroneal tendons.  Edema at the lateral and medial ankle.  High-grade cartilage loss at the ankle joint.  --- End of Report ---      DARIAN HONEYCUTT MD; Attending Radiologist This document has been electronically signed. Sep 26 2024  7:48PM

## 2024-10-04 RX ORDER — TRAMADOL HYDROCHLORIDE 50 MG/1
50 TABLET, COATED ORAL
Qty: 20 | Refills: 0 | Status: ACTIVE | COMMUNITY
Start: 2024-10-04 | End: 1900-01-01

## 2024-10-09 ENCOUNTER — APPOINTMENT (OUTPATIENT)
Dept: ORTHOPEDIC SURGERY | Facility: CLINIC | Age: 79
End: 2024-10-09
Payer: MEDICARE

## 2024-10-09 DIAGNOSIS — S92.002A UNSPECIFIED FRACTURE OF LEFT CALCANEUS, INITIAL ENCOUNTER FOR CLOSED FRACTURE: ICD-10-CM

## 2024-10-09 PROCEDURE — 73650 X-RAY EXAM OF HEEL: CPT | Mod: LT

## 2024-10-09 PROCEDURE — 99213 OFFICE O/P EST LOW 20 MIN: CPT

## 2024-10-09 PROCEDURE — 73610 X-RAY EXAM OF ANKLE: CPT | Mod: LT

## 2024-11-04 ENCOUNTER — APPOINTMENT (OUTPATIENT)
Dept: ORTHOPEDIC SURGERY | Facility: CLINIC | Age: 79
End: 2024-11-04